# Patient Record
Sex: FEMALE | URBAN - METROPOLITAN AREA
[De-identification: names, ages, dates, MRNs, and addresses within clinical notes are randomized per-mention and may not be internally consistent; named-entity substitution may affect disease eponyms.]

---

## 2018-02-15 ENCOUNTER — OFFICE VISIT (OUTPATIENT)
Dept: DERMATOLOGY | Facility: CLINIC | Age: 38
End: 2018-02-15
Payer: COMMERCIAL

## 2018-02-15 DIAGNOSIS — L70.0 ACNE VULGARIS: Primary | ICD-10-CM

## 2018-02-15 DIAGNOSIS — L81.0 POST-INFLAMMATORY HYPERPIGMENTATION: ICD-10-CM

## 2018-02-15 RX ORDER — HYDROQUINONE 40 MG/G
CREAM TOPICAL
Qty: 30 G | Refills: 3 | Status: SHIPPED | OUTPATIENT
Start: 2018-02-15 | End: 2018-04-24

## 2018-02-15 RX ORDER — TRETINOIN 0.25 MG/G
CREAM TOPICAL
Qty: 45 G | Refills: 1 | Status: SHIPPED | OUTPATIENT
Start: 2018-02-15 | End: 2018-04-24

## 2018-02-15 ASSESSMENT — PAIN SCALES - GENERAL: PAINLEVEL: NO PAIN (0)

## 2018-02-15 NOTE — LETTER
2/15/2018     RE: Zeeshan Feng  [address]  [facility] [address]     Dear Colleague,    Thank you for referring your patient, Zeeshan Feng, to the [facility] DERMATOLOGY at [facility]. Please see a copy of my visit note below.    [facility] Dermatology Note    Dermatology Problem List:  1.Acne Vulgaris- tretinoin 0.025%  2.Hyperpigmetnation- tretinoin 0.025%, hydroquinone 4% BID      Encounter Date: Feb 15, 2018    CC:  Chief Complaint   Patient presents with     Skin Check     Zeeshan is here today for a skin check- some areas of concern on her face.        History of Present Illness:  Ms. Zeeshan Feng is a 37 year old female who presents with some areas of concern on the face. The patient explains she has tried numerous over the counter products (listed below) to try to correct dark spots on her face as well as lighten her facial skin in general. Patient states she also tried to lighten the general color of her skin on her body for a a while this regimen has been working for her body. She is discouraged because she hasn't had the same results on her face.     The patient states she also has acne, particularly on the bilateral cheeks, and would like to treat this.     Ida Lauder Advanced Night Repair   Vantex Skin Bleaching Cream/ Skin Brightening Night Serum/ True tone exfoliating cleanser   Lait 90 Eclaircissant- body -contains kojic acid  Clarins Toning Lotion   L'Oreal Triple Active Creme   Rhodesclair Vitamin E  White Essence Correcting Lightening serum -hydroquinone 2%  Clarins One-Stp Gentle Exfoliating Cleanser   Lacome Advanced Skin Corrector   Sue Hightowerden Ceramide capsules     Patient otherwise voices no further concerns at this time.     Past Medical History:   There is no problem list on file for this patient.    No past medical history on file.  No past surgical history on file.    Family History:  No family history of skin related diseases.      Medications:  No current outpatient prescriptions on file.       No Known Allergies    Review of Systems:  -Skin/Heme New Pt: The patient denies excessive scarring or problems healing except as per HPI. The patient denies excessive bleeding.  -Constitutional: The patient denies fatigue, fevers, chills, unintended weight loss, and night sweats.  -HEENT: Patient denies nonhealing oral sores.  -Skin: As above in HPI. No additional skin concerns.    Physical exam:  Vitals: There were no vitals taken for this visit.  GEN: This is a well developed, well-nourished female in no acute distress, in a pleasant mood.    SKIN: Focused examination of the face, abdomen, chest, bilateral upper extremities was performed.  -There is xerosis of the arms and abdomen  - Scattered hyperpigmentation macules on the cheeks bilaterally.   - Generalized mild hyperpigmentation of the face   -No other lesions of concern on areas examined.       Impression/Plan:  1. Acne vulgaris, face      Cleanse with gentle skin cleansers such as Cetaphil or CeraVe. May also continue using Clarins.     Moisturize after washing, ideally with a lotion that has at least SPF 30 in the morning.     Recommended beginning tretinoin 0.025% cream- apply a pea sized amount of the face before bed. Advised this can be drying/ irritating and therefore should increase to nightly as tolerated.      2. Generalized Mild Hyperpigmentation of the face - patient also has areas of PIH s/p acne papules    Begin hydroquinone 4% cream BID. Patient was advised to use this for no longer than 8 weeks and to discontinue use should hyperpigmentation increase or fail to lessen.      Start Tretinoin as described above    May continue with Vitamin C oil- to be applied once daily.    Suncreen SPF 30+ daily    Emphasized to the patient that there is no guarantee these products will provide her the results she is seeking and she should therefore maintain realistic expectations. Patient  expressed understanding.     We had a long discussion about variations in skin tone/color, and that some degree of variation is normal     3.    Xerosis of the arms and abdomen     Recommended moisturizers such as CeraVe or Cetaphil.     Follow-up in 2-3 months, sooner if new concerns or changes.       Staff Involved:  Scribe/Staff    Scribe Disclosure:   I, Lin Rodriguez, am serving as a scribe to document services personally performed by Lisbet Haq PA-C, based on data collection and the provider's statements to me.  Provider Disclosure:   The documentation recorded by the scribe accurately reflects the services I personally performed and the decisions made by me.    Lisbet Haq PA-C  [facility]: Phone: [phone], Fax: [phone]

## 2018-02-15 NOTE — MR AVS SNAPSHOT
After Visit Summary   2/15/2018    Zeeshan Feng    MRN: [id number]           Patient Information     Date Of Birth          1980        Visit Information        Provider Department      2/15/2018 6:00 PM Lisbet Haq PA-C [facility] Dermatology        Care Instructions    Sunscreen SPF30 daily  -Cetaphil - [employer]  -Cerave - [employer]    Cleansers  -keep using what you have  -Cetaphil basic cleanser - [employer]    Moisturizer at night  -cetaphil or cerave - target    Rx: tretinoin 0.025% cream  - helps acne, helps even skin tone, helps fine lines  Apply to clean dry skin at night only    Rx: hydroquinone 4% cream twice a day - bleaching cream  -use daily for no more than 8 weeks  Stop if darkness gets worse  Stop if no improvement after 8 weeks    Continue with vitamin C serum in the morning            Follow-ups after your visit        Your next 10 appointments already scheduled     Mar 07, 2018 12:30 PM CST   (Arrive by 12:15 PM)   New Patient Visit with SILVIA Truong [facility] Dermatology ([facility] [place])    [address] [place] [id number] [phone]              Who to contact     Please call your clinic at [phone] to:    Ask questions about your health    Make or cancel appointments    Discuss your medicines    Learn about your test results    Speak to your doctor            Additional Information About Your Visit        MyChart Information     [employer] is an electronic gateway that provides easy, online access to your medical records. With [employer], you can request a clinic appointment, read your test results, renew a prescription or communicate with your care team.     To sign up for [employer] visit the website at [email or web address]   You will be asked to enter the access code listed below, as well as some personal information. Please follow the directions to create your username and password.     Your access code is:  [email or web address]  Expires: 2018  6:30 AM     Your access code will  in 90 days. If you need help or a new code, please contact your [facility] or call [phone] for assistance.        Care EveryWhere ID     This is your Care EveryWhere ID. This could be used by other organizations to access your [place] medical records  [id number]         Blood Pressure from Last 3 Encounters:   No data found for BP    Weight from Last 3 Encounters:   No data found for Wt              Today, you had the following     No orders found for display       Primary Care Provider Fax #    Physician No Ref-Primary [phone]       No address on file        Equal Access to Services     NEENA [facility] : Hadii jose Avendano, waaxda ya, qasanju kaalmada yomi, julian valentino . So [facility] [phone].    ATENCIÓN: Si habla español, tiene a beltre disposición servicios gratuitos de asistencia lingüística. Llame al [phone].    We comply with applicable federal civil rights laws and [place] laws. We do not discriminate on the basis of race, color, national origin, age, disability, sex, sexual orientation, or gender identity.            Thank you!     Thank you for choosing [facility]  for your care. Our goal is always to provide you with excellent care. Hearing back from our patients is one way we can continue to improve our services. Please take a few minutes to complete the written survey that you may receive in the mail after your visit with us. Thank you!             Your Updated Medication List - Protect others around you: Learn how to safely use, store and throw away your medicines at www.disposemymeds.org.      Notice  As of 2/15/2018  6:33 PM    You have not been prescribed any medications.

## 2018-02-16 NOTE — PROGRESS NOTES
[facility] Dermatology Note    Dermatology Problem List:  1.Acne Vulgaris- tretinoin 0.025%  2.Hyperpigmetnation- tretinoin 0.025%, hydroquinone 4% BID      Encounter Date: Feb 15, 2018    CC:  Chief Complaint   Patient presents with     Skin Check     Zeeshan is here today for a skin check- some areas of concern on her face.        History of Present Illness:  Ms. Zeeshan Feng is a 37 year old female who presents with some areas of concern on the face. The patient explains she has tried numerous over the counter products (listed below) to try to correct dark spots on her face as well as lighten her facial skin in general. Patient states she also tried to lighten the general color of her skin on her body for a a while this regimen has been working for her body. She is discouraged because she hasn't had the same results on her face.     The patient states she also has acne, particularly on the bilateral cheeks, and would like to treat this.     Ida Lauder Advanced Night Repair   Vantex Skin Bleaching Cream/ Skin Brightening Night Serum/ True tone exfoliating cleanser   Lait 90 Eclaircissant- body -contains kojic acid  Clarins Toning Lotion   L'Oreal Triple Active Creme   Rhodesclair Vitamin E  White Essence Correcting Lightening serum -hydroquinone 2%  Clarins One-Stp Gentle Exfoliating Cleanser   Lacome Advanced Skin Corrector   Sue Hightowerden Ceramide capsules     Patient otherwise voices no further concerns at this time.     Past Medical History:   There is no problem list on file for this patient.    No past medical history on file.  No past surgical history on file.    Family History:  No family history of skin related diseases.     Medications:  No current outpatient prescriptions on file.       No Known Allergies    Review of Systems:  -Skin/Heme New Pt: The patient denies excessive scarring or problems healing except as per HPI. The patient denies excessive bleeding.  -Constitutional: The  patient denies fatigue, fevers, chills, unintended weight loss, and night sweats.  -HEENT: Patient denies nonhealing oral sores.  -Skin: As above in HPI. No additional skin concerns.    Physical exam:  Vitals: There were no vitals taken for this visit.  GEN: This is a well developed, well-nourished female in no acute distress, in a pleasant mood.    SKIN: Focused examination of the face, abdomen, chest, bilateral upper extremities was performed.  -There is xerosis of the arms and abdomen  - Scattered hyperpigmentation macules on the cheeks bilaterally.   - Generalized mild hyperpigmentation of the face   -No other lesions of concern on areas examined.       Impression/Plan:  1. Acne vulgaris, face      Cleanse with gentle skin cleansers such as Cetaphil or CeraVe. May also continue using Clarins.     Moisturize after washing, ideally with a lotion that has at least SPF 30 in the morning.     Recommended beginning tretinoin 0.025% cream- apply a pea sized amount of the face before bed. Advised this can be drying/ irritating and therefore should increase to nightly as tolerated.      2. Generalized Mild Hyperpigmentation of the face - patient also has areas of PIH s/p acne papules    Begin hydroquinone 4% cream BID. Patient was advised to use this for no longer than 8 weeks and to discontinue use should hyperpigmentation increase or fail to lessen.      Start Tretinoin as described above    May continue with Vitamin C oil- to be applied once daily.    Suncreen SPF 30+ daily    Emphasized to the patient that there is no guarantee these products will provide her the results she is seeking and she should therefore maintain realistic expectations. Patient expressed understanding.     We had a long discussion about variations in skin tone/color, and that some degree of variation is normal     3.    Xerosis of the arms and abdomen     Recommended moisturizers such as CeraVe or Cetaphil.     Follow-up in 2-3 months, sooner  if new concerns or changes.       Staff Involved:  Scribe/Staff    Scribe Disclosure:   I, Lin Rodriguez, am serving as a scribe to document services personally performed by Lisbet Haq PA-C, based on data collection and the provider's statements to me.  Provider Disclosure:   The documentation recorded by the scribe accurately reflects the services I personally performed and the decisions made by me.    Lisbet Haq PA-C  [facility]: Phone: [phone], Fax: [phone]

## 2018-02-16 NOTE — PATIENT INSTRUCTIONS
Sunscreen SPF30 daily  -Cetaphil - [employer]  -Cerave - [employer]    Cleansers  -keep using what you have  -Cetaphil basic cleanser - [employer]    Moisturizer at night  -cetaphil or cerave - target    Rx: tretinoin 0.025% cream  - helps acne, helps even skin tone, helps fine lines  Apply to clean dry skin at night only    Rx: hydroquinone 4% cream twice a day - bleaching cream  -use daily for no more than 8 weeks  Stop if darkness gets worse  Stop if no improvement after 8 weeks    Continue with vitamin C serum in the morning

## 2018-02-16 NOTE — NURSING NOTE
Dermatology Rooming Note    Zeeshan Feng's goals for this visit include:   Chief Complaint   Patient presents with     Skin Check     Malvinsue is here today for a skin check- some areas of concern on her face.      Sara Wood MA

## 2018-02-28 ENCOUNTER — TELEPHONE (OUTPATIENT)
Dept: DERMATOLOGY | Facility: CLINIC | Age: 38
End: 2018-02-28

## 2018-03-18 ENCOUNTER — HEALTH MAINTENANCE LETTER (OUTPATIENT)
Age: 38
End: 2018-03-18

## 2018-04-24 ENCOUNTER — OFFICE VISIT (OUTPATIENT)
Dept: DERMATOLOGY | Facility: CLINIC | Age: 38
End: 2018-04-24
Payer: COMMERCIAL

## 2018-04-24 DIAGNOSIS — L81.0 POST-INFLAMMATORY HYPERPIGMENTATION: ICD-10-CM

## 2018-04-24 DIAGNOSIS — L70.0 ACNE VULGARIS: Primary | ICD-10-CM

## 2018-04-24 RX ORDER — HYDROQUINONE 40 MG/G
CREAM TOPICAL
Qty: 30 G | Refills: 3 | Status: SHIPPED | OUTPATIENT
Start: 2018-04-24 | End: 2018-08-24

## 2018-04-24 RX ORDER — TRETINOIN 0.25 MG/G
CREAM TOPICAL
Qty: 45 G | Refills: 1 | Status: SHIPPED | OUTPATIENT
Start: 2018-04-24 | End: 2018-08-24

## 2018-04-24 RX ORDER — CLINDAMYCIN PHOSPHATE 10 UG/ML
LOTION TOPICAL 2 TIMES DAILY
Qty: 60 ML | Refills: 3 | Status: SHIPPED | OUTPATIENT
Start: 2018-04-24 | End: 2018-08-24

## 2018-04-24 ASSESSMENT — PAIN SCALES - GENERAL: PAINLEVEL: NO PAIN (0)

## 2018-04-24 NOTE — NURSING NOTE
Dermatology Rooming Note    Zeeshan Feng's goals for this visit include:   Chief Complaint   Patient presents with     Derm Problem     Malvinsue is here today for a skin follow up- Zeeshan is unsure if her skin has improved or not.      Sara Wood MA

## 2018-04-24 NOTE — PROGRESS NOTES
[facility] Dermatology Note      Dermatology Problem List:  1.Acne Vulgaris- tretinoin 0.025% QHS, clindamycin 1% lotion QD  2.Hyperpigmetnation- tretinoin 0.025%, hydroquinone 4% BID    CC:   Chief Complaint   Patient presents with     Derm Problem     Zeeshan is here today for a skin follow up- Zeeshan is unsure if her skin has improved or not.        Encounter Date: Apr 24, 2018    History of Present Illness:  Ms. Zeeshan Feng is a 37 year old female who presents for an acne and hyperpigmentation follow-up. She has been using the tretinoin and hydroquinone. She initially felt it was helping, but then last week she developed more pimples and the dark spots returned. She reports tolerable dryness with the tretinoin, but is managing it well with Cerave lotion. She has been using Cerave cleanser as well. She has no other concerns today and is feeling well.     Past Medical History:   There is no problem list on file for this patient.    History reviewed. No pertinent past medical history.  History reviewed. No pertinent surgical history.    Social History:  Not obtained today.    Family History:  No fhx of skin related diseases    Medications:  Current Outpatient Prescriptions   Medication Sig Dispense Refill     clindamycin (CLEOCIN T) 1 % lotion Apply topically 2 times daily 60 mL 3     hydroquinone 4 % CREA Apply topically QD or BID, for no more than 8 consecutive weeks 30 g 3     tretinoin (RETIN-A) 0.025 % cream Use every night 45 g 1     No Known Allergies      Review of Systems:  -Skin Establ Pt: The patient denies any new rash, pruritus, or lesions that are symptomatic, changing or bleeding, except as per HPI.  -Constitutional: The patient denies fatigue, fevers, chills, unintended weight loss, and night sweats.  -Skin: As above in HPI. No additional skin concerns.    Physical exam:  Vitals: There were no vitals taken for this visit.  GEN: This is a well developed, well-nourished female in no acute  distress, in a pleasant mood.    SKIN: Focused examination of the face, chest, and neck was performed.  - Scattered hyperpigmented macules on the cheeks bilaterally.   - Rare papules and comedones on the cheeks and forehead  - Generalized mild hyperpigmentation of the face   -No other lesions of concern on areas examined.     Impression/Plan:  1. Acne vulgaris, face      Cleanse with gentle skin cleansers such as Cetaphil or CeraVe.      Start clindamycin 1% lotion QAM before sunscreen application    Moisturize after washing, ideally with a lotion that has at least SPF 30 in the morning.     Continue tretinoin 0.025% cream- apply a pea sized amount of the face before bed mixed 1:1 with cerave lotion. Advised this can be drying/ irritating and therefore should increase to nightly as tolerated.      If still getting acne at 3mo follow-up will consider istoretinoin     2. Generalized Mild Hyperpigmentation of the face - patient also has areas of PIH s/p acne papules - improved    Continue hydroquinone 4% cream QD or BID    Continue Tretinoin as described above    May continue with Vitamin C oil- to be applied once daily.    Suncreen SPF 30+ daily    CC Dr. Gomez on close of this encounter.  Follow-up in 3 months, earlier for new or changing lesions.       Staff Involved:  Staff Only  All risks, benefits and alternatives were discussed with patient.  Patient is in agreement and understands the assessment and plan.  All questions were answered.    Lisbet Haq PA-C  [facility] [place]: Phone: [phone], Fax: [phone]

## 2018-04-24 NOTE — MR AVS SNAPSHOT
After Visit Summary   4/24/2018    Zeeshan Feng    MRN: [id number]           Patient Information     Date Of Birth          1980        Visit Information        Provider Department      4/24/2018 5:45 PM Lisbet Haq PA-C [facility] Dermatology        Today's Diagnoses     Acne vulgaris    -  1    Post-inflammatory hyperpigmentation          Care Instructions    Morning:  Clean your face - cerave cleanser  Clindamycin 1% lotion  Sunscreen    Evening:  Wash your face - cerave cleanser  Blend tretinoin with cerave moisturizer and apply to entire face          Follow-ups after your visit        Who to contact     Please call your clinic at [phone] to:    Ask questions about your health    Make or cancel appointments    Discuss your medicines    Learn about your test results    Speak to your doctor            Additional Information About Your Visit        MyChart Information     [email or web address] gives you secure access to your electronic health record. If you see a primary care provider, you can also send messages to your care team and make appointments. If you have questions, please call your primary care clinic.  If you do not have a primary care provider, please call [phone] and they will assist you.      [email or web address] is an electronic gateway that provides easy, online access to your medical records. With [email or web address], you can request a clinic appointment, read your test results, renew a prescription or communicate with your care team.     To access your existing account, please contact your [facility] or call [phone] for assistance.        Care EveryWhere ID     This is your Care EveryWhere ID. This could be used by other organizations to access your [place] medical records  [id number]         Blood Pressure from Last 3 Encounters:   No data found for BP    Weight from Last 3 Encounters:   No data found for Wt              Today, you had the following     No orders  found for display         Today's Medication Changes          These changes are accurate as of 4/24/18  6:07 PM.  If you have any questions, ask your nurse or doctor.               Start taking these medicines.        Dose/Directions    clindamycin 1 % lotion   Commonly known as:  CLEOCIN T   Used for:  Acne vulgaris   Started by:  Lisbet Haq PA-C        Apply topically 2 times daily   Quantity:  60 mL   Refills:  3            Where to get your medicines      These medications were sent to [place] [address] [facility] - [place], [place] - [address]  [address] [address]     Phone:  [phone]     clindamycin 1 % lotion    hydroquinone 4 % Crea    tretinoin 0.025 % cream                Primary Care Provider Fax #    Physician No Ref-Primary [phone]       No address on file        Equal Access to Services     ADELITA SZYMANSKI : Valerie johnson Somikal, waaxda luqadaha, qaybta kaalmada adeegyakay, julian valentino . So [facility] [phone].    ATENCIÓN: Si habla español, tiene a beltre disposición servicios gratuitos de asistencia lingüística. Llame al [phone].    We comply with applicable federal civil rights laws and [place] laws. We do not discriminate on the basis of race, color, national origin, age, disability, sex, sexual orientation, or gender identity.            Thank you!     Thank you for choosing [facility] DERMATOLOGY  for your care. Our goal is always to provide you with excellent care. Hearing back from our patients is one way we can continue to improve our services. Please take a few minutes to complete the written survey that you may receive in the mail after your visit with us. Thank you!             Your Updated Medication List - Protect others around you: Learn how to safely use, store and throw away your medicines at www.disposemymeds.org.          This list is accurate as of 4/24/18  6:07 PM.  Always use your most recent med list.                    Brand Name Dispense Instructions for use Diagnosis    clindamycin 1 % lotion    CLEOCIN T    60 mL    Apply topically 2 times daily    Acne vulgaris       hydroquinone 4 % Crea     30 g    Apply topically QD or BID, for no more than 8 consecutive weeks    Post-inflammatory hyperpigmentation       tretinoin 0.025 % cream    RETIN-A    45 g    Use every night    Acne vulgaris

## 2018-04-24 NOTE — PATIENT INSTRUCTIONS
Morning:  Clean your face - cerave cleanser  Clindamycin 1% lotion  Sunscreen    Evening:  Wash your face - cerave cleanser  Blend tretinoin with cerave moisturizer and apply to entire face

## 2018-04-24 NOTE — LETTER
4/24/2018       RE: Zeeshan Feng  [address]  [facility] [address]     Dear Colleague,    Thank you for referring your patient, Zeeshan Feng, to the [facility] DERMATOLOGY at [facility]. Please see a copy of my visit note below.    [facility] Dermatology Note      Dermatology Problem List:  1.Acne Vulgaris- tretinoin 0.025% QHS, clindamycin 1% lotion QD  2.Hyperpigmetnation- tretinoin 0.025%, hydroquinone 4% BID    CC:   Chief Complaint   Patient presents with     Derm Problem     Zeeshan is here today for a skin follow up- Zeeshan is unsure if her skin has improved or not.        Encounter Date: Apr 24, 2018    History of Present Illness:  Ms. Zeeshan Feng is a 37 year old female who presents for an acne and hyperpigmentation follow-up. She has been using the tretinoin and hydroquinone. She initially felt it was helping, but then last week she developed more pimples and the dark spots returned. She reports tolerable dryness with the tretinoin, but is managing it well with Cerave lotion. She has been using Cerave cleanser as well. She has no other concerns today and is feeling well.     Past Medical History:   There is no problem list on file for this patient.    History reviewed. No pertinent past medical history.  History reviewed. No pertinent surgical history.    Social History:  Not obtained today.    Family History:  No fhx of skin related diseases    Medications:  Current Outpatient Prescriptions   Medication Sig Dispense Refill     clindamycin (CLEOCIN T) 1 % lotion Apply topically 2 times daily 60 mL 3     hydroquinone 4 % CREA Apply topically QD or BID, for no more than 8 consecutive weeks 30 g 3     tretinoin (RETIN-A) 0.025 % cream Use every night 45 g 1     No Known Allergies      Review of Systems:  -Skin Establ Pt: The patient denies any new rash, pruritus, or lesions that are symptomatic, changing or bleeding, except as per HPI.  -Constitutional: The  patient denies fatigue, fevers, chills, unintended weight loss, and night sweats.  -Skin: As above in HPI. No additional skin concerns.    Physical exam:  Vitals: There were no vitals taken for this visit.  GEN: This is a well developed, well-nourished female in no acute distress, in a pleasant mood.    SKIN: Focused examination of the face, chest, and neck was performed.  - Scattered hyperpigmented macules on the cheeks bilaterally.   - Rare papules and comedones on the cheeks and forehead  - Generalized mild hyperpigmentation of the face   -No other lesions of concern on areas examined.     Impression/Plan:  1. Acne vulgaris, face      Cleanse with gentle skin cleansers such as Cetaphil or CeraVe.      Start clindamycin 1% lotion QAM before sunscreen application    Moisturize after washing, ideally with a lotion that has at least SPF 30 in the morning.     Continue tretinoin 0.025% cream- apply a pea sized amount of the face before bed mixed 1:1 with cerave lotion. Advised this can be drying/ irritating and therefore should increase to nightly as tolerated.      If still getting acne at 3mo follow-up will consider istoretinoin     2. Generalized Mild Hyperpigmentation of the face - patient also has areas of PIH s/p acne papules - improved    Continue hydroquinone 4% cream QD or BID    Continue Tretinoin as described above    May continue with Vitamin C oil- to be applied once daily.    Suncreen SPF 30+ daily    CC Dr. Gomez on close of this encounter.  Follow-up in 3 months, earlier for new or changing lesions.       Staff Involved:  Staff Only  All risks, benefits and alternatives were discussed with patient.  Patient is in agreement and understands the assessment and plan.  All questions were answered.    Lisbet Haq PA-C  [facility] [place]: Phone: [phone], Fax: [phone]

## 2018-07-24 ENCOUNTER — OFFICE VISIT (OUTPATIENT)
Dept: DERMATOLOGY | Facility: CLINIC | Age: 38
End: 2018-07-24
Payer: COMMERCIAL

## 2018-07-24 ENCOUNTER — APPOINTMENT (OUTPATIENT)
Dept: LAB | Facility: CLINIC | Age: 38
End: 2018-07-24
Payer: COMMERCIAL

## 2018-07-24 DIAGNOSIS — L81.0 POST-INFLAMMATORY HYPERPIGMENTATION: ICD-10-CM

## 2018-07-24 DIAGNOSIS — Z79.899 ON ISOTRETINOIN THERAPY: Primary | ICD-10-CM

## 2018-07-24 DIAGNOSIS — L70.0 ACNE VULGARIS: ICD-10-CM

## 2018-07-24 LAB — HCG UR QL: NEGATIVE

## 2018-07-24 ASSESSMENT — PAIN SCALES - GENERAL: PAINLEVEL: NO PAIN (0)

## 2018-07-24 NOTE — PROGRESS NOTES
[facility] Dermatology Note      Dermatology Problem List:  1.Acne Vulgaris- tretinoin 0.025% QHS, clindamycin 1% lotion every day  -plans to start isotretinoin next month  2.Hyperpigmetnation- tretinoin 0.025%, hydroquinone 4% BID    CC:   Chief Complaint   Patient presents with     Derm Problem     Zeeshan is heren today for an acne and hyperpigmentation follow up- notes improvement.          Encounter Date: Jul 24, 2018    History of Present Illness:  Ms. Zeeshan Feng is a 38 year old female who returns regarding acne. She is currently on tretinoin, clindamycin and hydroquinone for her acne as well as her PIH. She states her acne has improved, although she is still getting new ones, and that her hyperpigmentation has improved greatly. Other people have commented to her that things are looking better. She is happy with this, but would like to stop getting acne. She is not currently sexually active and has no sexual partners. She has no hx of depression/anxiety. She has no hx of IBD. Aside from acne medications she takes no other medications at this time. She is otherwise healthy and feels well today.    Past Medical History:   There is no problem list on file for this patient.    History reviewed. No pertinent past medical history.  History reviewed. No pertinent surgical history.    Social History:  The patient works. Not currently sexually active at all.     Family History:  Not obtained today.    Medications:  Current Outpatient Prescriptions   Medication Sig Dispense Refill     clindamycin (CLEOCIN T) 1 % lotion Apply topically 2 times daily 60 mL 3     hydroquinone 4 % CREA Apply topically QD or BID, for no more than 8 consecutive weeks 30 g 3     tretinoin (RETIN-A) 0.025 % cream Use every night 45 g 1     No Known Allergies      Review of Systems:  -Constitutional: The patient denies fatigue, fevers, chills, unintended weight loss, and night sweats.  -Skin: As above in HPI. No additional skin  concerns.  -Neuro: no HA or vision changes  -GI: No nausea, blood in stool, diarrhea, hx of IBD  -Psych: no depression/anxiety, mood changes, or sleep problems   -Musculoskeletal: no joint or muscle pain or swelling   -Heme/Lymph: no concerning bumps, no bleeding problems    Physical exam:  Vitals: Not obtained this visit  GEN: This is a well developed, well-nourished female in no acute distress, in a pleasant mood.    SKIN: Acne exam, which includes the face, neck, upper central chest, and upper central back was performed.  -There are superifical acneiform papules with intermixed open and closed comedones on the cheeks and forehead, improved  -hyperpigmented papules and macules on the bilateral cheeks - improved since last visit  -No other lesions of concern on areas examined.     Impression/Plan:  1. Acne vulgaris  Will start isotretinoin 40mg next month. Goal dose is 150-220mg/kg for this patient.   Method of contraception includes: Abstinence  Discussion of the risks and side effects of isotretinoin including but not limited to mucocutaneous dryness, arthralgias, myalgias, depression, suicidal ideation, headache, blurred vision, increase in liver function test and increase in lipids. The iPledge program brochure was provided and the contents discussed with the patient. The patient was counseled that they cannot give blood while on isotretinoin. Advised against tattoos and waxing. No personal or family history of inflammatory bowel disease or hypertriglyceridemia known to patient. Reviewed need to avoid alcohol on medication. The iPledge program consent was obtained. Patient counseled that if they wear contacts, the eyes may become too dry to tolerate. Recommend follow up with eye doctor if this occurs.    Discussed need for sun protection, at least SPF 30+.  Urine pregnancy test obtained today.  Next month: baseline labs including qualitative hCG, CBC, GGT, BUN/Cr, fasting lipids and AST/ALT will be obtained.    Patient's iPledge # is [id number].   The patient will stop all other acne medications next month including tretinoin and clindamycin.  Total dose: 0mg/kg    2. Post-inflammatory Inflammation - improved    May continue with topical hydroquinone for now, will plan to discontinue next month when starts isotretinoin    Continue with photoprotection    CC Dr. Gomez on close of this encounter.  Follow-up in 1 month, earlier for new or changing lesions.       Staff Involved:  Staff Only  All risks, benefits and alternatives were discussed with patient.  Patient is in agreement and understands the assessment and plan.  All questions were answered.    Lisbet Haq PA-C  [facility]: Phone: [phone], Fax: [phone]

## 2018-07-24 NOTE — LETTER
7/24/2018       RE: Zeeshan Feng  [address]  [facility] [address]     Dear Colleague,    Thank you for referring your patient, Zeeshan Feng, to the [facility] DERMATOLOGY at [facility]. Please see a copy of my visit note below.    [facility] Dermatology Note      Dermatology Problem List:  1.Acne Vulgaris- tretinoin 0.025% QHS, clindamycin 1% lotion every day  -plans to start isotretinoin next month  2.Hyperpigmetnation- tretinoin 0.025%, hydroquinone 4% BID    CC:   Chief Complaint   Patient presents with     Derm Problem     Zeeshan is heren today for an acne and hyperpigmentation follow up- notes improvement.          Encounter Date: Jul 24, 2018    History of Present Illness:  Ms. Zeeshan Feng is a 38 year old female who returns regarding acne. She is currently on tretinoin, clindamycin and hydroquinone for her acne as well as her PIH. She states her acne has improved, although she is still getting new ones, and that her hyperpigmentation has improved greatly. Other people have commented to her that things are looking better. She is happy with this, but would like to stop getting acne. She is not currently sexually active and has no sexual partners. She has no hx of depression/anxiety. She has no hx of IBD. Aside from acne medications she takes no other medications at this time. She is otherwise healthy and feels well today.    Past Medical History:   There is no problem list on file for this patient.    History reviewed. No pertinent past medical history.  History reviewed. No pertinent surgical history.    Social History:  The patient works. Not currently sexually active at all.     Family History:  Not obtained today.    Medications:  Current Outpatient Prescriptions   Medication Sig Dispense Refill     clindamycin (CLEOCIN T) 1 % lotion Apply topically 2 times daily 60 mL 3     hydroquinone 4 % CREA Apply topically QD or BID, for no more than 8 consecutive  weeks 30 g 3     tretinoin (RETIN-A) 0.025 % cream Use every night 45 g 1     No Known Allergies      Review of Systems:  -Constitutional: The patient denies fatigue, fevers, chills, unintended weight loss, and night sweats.  -Skin: As above in HPI. No additional skin concerns.  -Neuro: no HA or vision changes  -GI: No nausea, blood in stool, diarrhea, hx of IBD  -Psych: no depression/anxiety, mood changes, or sleep problems   -Musculoskeletal: no joint or muscle pain or swelling   -Heme/Lymph: no concerning bumps, no bleeding problems    Physical exam:  Vitals: Not obtained this visit  GEN: This is a well developed, well-nourished female in no acute distress, in a pleasant mood.    SKIN: Acne exam, which includes the face, neck, upper central chest, and upper central back was performed.  -There are superifical acneiform papules with intermixed open and closed comedones on the cheeks and forehead, improved  -hyperpigmented papules and macules on the bilateral cheeks - improved since last visit  -No other lesions of concern on areas examined.     Impression/Plan:  1. Acne vulgaris  Will start isotretinoin 40mg next month. Goal dose is 150-220mg/kg for this patient.   Method of contraception includes: Abstinence  Discussion of the risks and side effects of isotretinoin including but not limited to mucocutaneous dryness, arthralgias, myalgias, depression, suicidal ideation, headache, blurred vision, increase in liver function test and increase in lipids. The iPledge program brochure was provided and the contents discussed with the patient. The patient was counseled that they cannot give blood while on isotretinoin. Advised against tattoos and waxing. No personal or family history of inflammatory bowel disease or hypertriglyceridemia known to patient. Reviewed need to avoid alcohol on medication. The iPledge program consent was obtained. Patient counseled that if they wear contacts, the eyes may become too dry to  tolerate. Recommend follow up with eye doctor if this occurs.    Discussed need for sun protection, at least SPF 30+.  Urine pregnancy test obtained today.  Next month: baseline labs including qualitative hCG, CBC, GGT, BUN/Cr, fasting lipids and AST/ALT will be obtained.   Patient's iPledge # is [id number].   The patient will stop all other acne medications next month including tretinoin and clindamycin.  Total dose: 0mg/kg    2. Post-inflammatory Inflammation - improved    May continue with topical hydroquinone for now, will plan to discontinue next month when starts isotretinoin    Continue with photoprotection    CC Dr. Gomez on close of this encounter.  Follow-up in 1 month, earlier for new or changing lesions.       Staff Involved:  Staff Only  All risks, benefits and alternatives were discussed with patient.  Patient is in agreement and understands the assessment and plan.  All questions were answered.    Lisbet Haq PA-C  [facility]: Phone: [phone], Fax: [phone]

## 2018-07-24 NOTE — NURSING NOTE
Dermatology Rooming Note    Zeeshan Feng's goals for this visit include:   Chief Complaint   Patient presents with     Derm Problem     Zeeshan is heren today for an acne and hyperpigmentation follow up- notes improvement.      Sara Wood MA

## 2018-08-24 ENCOUNTER — OFFICE VISIT (OUTPATIENT)
Dept: DERMATOLOGY | Facility: CLINIC | Age: 38
End: 2018-08-24
Payer: COMMERCIAL

## 2018-08-24 DIAGNOSIS — Z79.899 ON ISOTRETINOIN THERAPY: ICD-10-CM

## 2018-08-24 DIAGNOSIS — Z79.899 ON ISOTRETINOIN THERAPY: Primary | ICD-10-CM

## 2018-08-24 DIAGNOSIS — L70.0 ACNE VULGARIS: ICD-10-CM

## 2018-08-24 LAB
ALBUMIN SERPL-MCNC: 3.9 G/DL (ref 3.4–5)
ALP SERPL-CCNC: 83 U/L (ref 40–150)
ALT SERPL W P-5'-P-CCNC: 21 U/L (ref 0–50)
ANION GAP SERPL CALCULATED.3IONS-SCNC: 9 MMOL/L (ref 3–14)
AST SERPL W P-5'-P-CCNC: 17 U/L (ref 0–45)
BASOPHILS # BLD AUTO: 0 10E9/L (ref 0–0.2)
BASOPHILS NFR BLD AUTO: 0.4 %
BILIRUB SERPL-MCNC: 0.3 MG/DL (ref 0.2–1.3)
BUN SERPL-MCNC: 6 MG/DL (ref 7–30)
CALCIUM SERPL-MCNC: 8.7 MG/DL (ref 8.5–10.1)
CHLORIDE SERPL-SCNC: 106 MMOL/L (ref 94–109)
CO2 SERPL-SCNC: 24 MMOL/L (ref 20–32)
CREAT SERPL-MCNC: 0.75 MG/DL (ref 0.52–1.04)
DIFFERENTIAL METHOD BLD: NORMAL
EOSINOPHIL # BLD AUTO: 0 10E9/L (ref 0–0.7)
EOSINOPHIL NFR BLD AUTO: 0.3 %
ERYTHROCYTE [DISTWIDTH] IN BLOOD BY AUTOMATED COUNT: 13.7 % (ref 10–15)
GFR SERPL CREATININE-BSD FRML MDRD: 87 ML/MIN/1.7M2
GGT SERPL-CCNC: 12 U/L (ref 0–40)
GLUCOSE SERPL-MCNC: 84 MG/DL (ref 70–99)
HCG UR QL: NEGATIVE
HCT VFR BLD AUTO: 38.8 % (ref 35–47)
HGB BLD-MCNC: 13 G/DL (ref 11.7–15.7)
IMM GRANULOCYTES # BLD: 0 10E9/L (ref 0–0.4)
IMM GRANULOCYTES NFR BLD: 0.3 %
LYMPHOCYTES # BLD AUTO: 2.8 10E9/L (ref 0.8–5.3)
LYMPHOCYTES NFR BLD AUTO: 37.6 %
MCH RBC QN AUTO: 31.5 PG (ref 26.5–33)
MCHC RBC AUTO-ENTMCNC: 33.5 G/DL (ref 31.5–36.5)
MCV RBC AUTO: 94 FL (ref 78–100)
MONOCYTES # BLD AUTO: 0.5 10E9/L (ref 0–1.3)
MONOCYTES NFR BLD AUTO: 7.2 %
NEUTROPHILS # BLD AUTO: 4.1 10E9/L (ref 1.6–8.3)
NEUTROPHILS NFR BLD AUTO: 54.2 %
NRBC # BLD AUTO: 0 10*3/UL
NRBC BLD AUTO-RTO: 0 /100
PLATELET # BLD AUTO: 200 10E9/L (ref 150–450)
POTASSIUM SERPL-SCNC: 3.5 MMOL/L (ref 3.4–5.3)
PROT SERPL-MCNC: 7.4 G/DL (ref 6.8–8.8)
RBC # BLD AUTO: 4.13 10E12/L (ref 3.8–5.2)
SODIUM SERPL-SCNC: 139 MMOL/L (ref 133–144)
TRIGL SERPL-MCNC: 72 MG/DL
WBC # BLD AUTO: 7.6 10E9/L (ref 4–11)

## 2018-08-24 RX ORDER — ISOTRETINOIN 40 MG/1
40 CAPSULE ORAL DAILY
Qty: 30 CAPSULE | Refills: 0 | Status: SHIPPED | OUTPATIENT
Start: 2018-08-24 | End: 2018-09-26

## 2018-08-24 ASSESSMENT — PAIN SCALES - GENERAL: PAINLEVEL: NO PAIN (0)

## 2018-08-24 NOTE — MR AVS SNAPSHOT
After Visit Summary   8/24/2018    Zeeshan Feng    MRN: [id number]           Patient Information     Date Of Birth          1980        Visit Information        Provider Department      8/24/2018 2:45 PM Lisbet Haq PA-C [facility] Dermatology        Today's Diagnoses     On isotretinoin therapy    -  1    Acne vulgaris          Care Instructions    cetaphil or cerave products - [facility]          Follow-ups after your visit        Follow-up notes from your care team     Return in about 1 month (around 9/24/2018).      Your next 10 appointments already scheduled     Aug 24, 2018  3:00 PM CDT   LAB with  LAB   [facility] ([facility])    [address] [place] [id number] [phone]           Please do not eat 10-12 hours before your appointment if you are coming in fasting for labs on lipids, cholesterol, or glucose (sugar). This does not apply to pregnant women. Water, hot tea and black coffee (with nothing added) are okay. Do not drink other fluids, diet soda or chew gum.            Sep 26, 2018  5:30 PM CDT   (Arrive by 5:15 PM)   Return Visit with SILVIA Diaz [facility] Dermatology ([facility])    [address] [place] [id number] [phone]              Future tests that were ordered for you today     Open Future Orders        Priority Expected Expires Ordered    CBC with platelets differential Routine  8/24/2019 8/24/2018    Comprehensive metabolic panel Routine  8/24/2019 8/24/2018    GGT Routine  8/25/2019 8/24/2018    Triglycerides Routine  8/24/2019 8/24/2018            Who to contact     Please call your clinic at [phone] to:    Ask questions about your health    Make or cancel appointments    Discuss your medicines    Learn about your test results    Speak to your doctor            Additional Information About Your Visit        MyChart Information      [employer] gives you secure access to your electronic health record. If you see a primary care provider, you can also send messages to your care team and make appointments. If you have questions, please call your primary care clinic.  If you do not have a primary care provider, please call [phone] and they will assist you.      [employer] is an electronic gateway that provides easy, online access to your medical records. With [employer], you can request a clinic appointment, read your test results, renew a prescription or communicate with your care team.     To access your existing account, please contact your [facility] or call [phone] for assistance.        Care EveryWhere ID     This is your Care EveryWhere ID. This could be used by other organizations to access your [facility] medical records  [id number]         Blood Pressure from Last 3 Encounters:   No data found for BP    Weight from Last 3 Encounters:   No data found for Wt              We Performed the Following     HCG Qual, Urine - [facility],  Bebe Paige  ([id number])          Today's Medication Changes          These changes are accurate as of 8/24/18  2:58 PM.  If you have any questions, ask your nurse or doctor.               Start taking these medicines.        Dose/Directions    ISOtretinoin 40 MG capsule   Commonly known as:  ACCUTANE   Used for:  Acne vulgaris   Started by:  Lisbet Haq PA-C        Dose:  40 mg   Take 1 capsule (40 mg) by mouth daily   Quantity:  30 capsule   Refills:  0            Where to get your medicines      These medications were sent to [place] [address] [place] [facility] [address] [facility]  [address] [facility] [address]     Phone:  [phone]     ISOtretinoin 40 MG capsule                Primary Care Provider Fax #    Physician No Ref-Primary [phone]       No address on file        Equal Access to Services     ADELITA SZYMANSKI AH: cholo Ahuja,  marily barbozaalzahida phillipmichael soilain hayaan adeeg kharash la'aan ah. So [facility] [phone].    ATENCIÓN: Si mariella mounika, tiene a beltre disposición servicios gratuitos de asistencia lingüística. Lauraame al [phone].    We comply with applicable federal civil rights laws and [place] laws. We do not discriminate on the basis of race, color, national origin, age, disability, sex, sexual orientation, or gender identity.            Thank you!     Thank you for choosing [facility] DERMATOLOGY  for your care. Our goal is always to provide you with excellent care. Hearing back from our patients is one way we can continue to improve our services. Please take a few minutes to complete the written survey that you may receive in the mail after your visit with us. Thank you!             Your Updated Medication List - Protect others around you: Learn how to safely use, store and throw away your medicines at www.disposemymeds.org.          This list is accurate as of 8/24/18  2:58 PM.  Always use your most recent med list.                   Brand Name Dispense Instructions for use Diagnosis    ISOtretinoin 40 MG capsule    ACCUTANE    30 capsule    Take 1 capsule (40 mg) by mouth daily    Acne vulgaris

## 2018-08-24 NOTE — PROGRESS NOTES
[facility] Dermatology Note      Dermatology Problem List:  1.Acne Vulgaris- started isotretinoin 40mg po QD 8/24/18  -previous txs: tretinoin 0.025% QHS, clindamycin 1% lotion every day  2.Hyperpigmetnation- tretinoin 0.025%, hydroquinone 4% BID    CC:   Chief Complaint   Patient presents with     Accutane     Accutane recheck         Encounter Date: Aug 24, 2018    History of Present Illness:  Ms. Zeeshan Feng is a 38 year old female who returns regarding acne. She is here to start isotretinoin today. She received the ipledge booklet and read over it and has no further questions. She plans to discontinue her tretinoin, clindamycin and hydroquinone today. No hx of depression, anxiety, UC or Chrons. She is not currently sexually active. She is otherwise healthy and feels well today.     Past Medical History:   There is no problem list on file for this patient.    No past medical history on file.  No past surgical history on file.    Social History:  The patient works. Not currently sexually active at all.     Family History:  Not obtained today.    Medications:  Current Outpatient Prescriptions   Medication Sig Dispense Refill     clindamycin (CLEOCIN T) 1 % lotion Apply topically 2 times daily 60 mL 3     hydroquinone 4 % CREA Apply topically QD or BID, for no more than 8 consecutive weeks 30 g 3     tretinoin (RETIN-A) 0.025 % cream Use every night 45 g 1     No Known Allergies      Review of Systems:  -Constitutional: The patient denies fatigue, fevers, chills, unintended weight loss, and night sweats.  -Skin: As above in HPI. No additional skin concerns.  -Neuro: no HA or vision changes  -GI: No nausea, blood in stool, diarrhea, hx of IBD  -Psych: no depression/anxiety, mood changes, or sleep problems   -Musculoskeletal: no joint or muscle pain or swelling   -Heme/Lymph: no concerning bumps, no bleeding problems    Physical exam:  Vitals: 130lbs  GEN: This is a well developed, well-nourished  female in no acute distress, in a pleasant mood.    SKIN: Acne exam, which includes the face, neck, upper central chest, and upper central back was performed.  -There are superifical acneiform papules with intermixed open and closed comedones on the cheeks and forehead, improved  -hyperpigmented papules and macules on the bilateral cheeks - improved since last visit  -No other lesions of concern on areas examined.     Impression/Plan:  1. Acne vulgaris  Will start isotretinoin 40mg this month. Goal dose is 150-220mg/kg for this patient.   Method of contraception includes: Abstinence  Discussion of the risks and side effects of isotretinoin including but not limited to mucocutaneous dryness, arthralgias, myalgias, depression, suicidal ideation, headache, blurred vision, increase in liver function test and increase in lipids. The iPledge program brochure was provided and the contents discussed with the patient. The patient was counseled that they cannot give blood while on isotretinoin. Advised against tattoos and waxing. No personal or family history of inflammatory bowel disease or hypertriglyceridemia known to patient. Reviewed need to avoid alcohol on medication. The iPledge program consent was obtained. Patient counseled that if they wear contacts, the eyes may become too dry to tolerate. Recommend follow up with eye doctor if this occurs.    Discussed need for sun protection, at least SPF 30+.  Urine pregnancy test obtained today.  Labs including qualitative hCG, CBC, GGT, BUN/Cr, fasting lipids and AST/ALT will be obtained.   Patient's iPledge # is [id number].   Total dose: 0mg/kg    CC Dr. Gomez on close of this encounter.  Follow-up in 1 month, earlier for new or changing lesions.       Staff Involved:  Staff Only  All risks, benefits and alternatives were discussed with patient.  Patient is in agreement and understands the assessment and plan.  All questions were answered.    Lisbet Haq  SILVIA  [facility]: Phone: [phone], Fax: [phone]

## 2018-08-24 NOTE — LETTER
8/24/2018       RE: Zeeshan Feng  [address]  [facility] [address]     Dear Colleague,    Thank you for referring your patient, Zeeshan Feng, to the [facility] DERMATOLOGY at [facility]. Please see a copy of my visit note below.    [facility] Dermatology Note      Dermatology Problem List:  1.Acne Vulgaris- started isotretinoin 40mg po QD 8/24/18  -previous txs: tretinoin 0.025% QHS, clindamycin 1% lotion every day  2.Hyperpigmetnation- tretinoin 0.025%, hydroquinone 4% BID    CC:   Chief Complaint   Patient presents with     Accutane     Accutane recheck         Encounter Date: Aug 24, 2018    History of Present Illness:  Ms. Zeeshan Feng is a 38 year old female who returns regarding acne. She is here to start isotretinoin today. She received the ipledge booklet and read over it and has no further questions. She plans to discontinue her tretinoin, clindamycin and hydroquinone today. No hx of depression, anxiety, UC or Chrons. She is not currently sexually active. She is otherwise healthy and feels well today.     Past Medical History:   There is no problem list on file for this patient.    No past medical history on file.  No past surgical history on file.    Social History:  The patient works. Not currently sexually active at all.     Family History:  Not obtained today.    Medications:  Current Outpatient Prescriptions   Medication Sig Dispense Refill     clindamycin (CLEOCIN T) 1 % lotion Apply topically 2 times daily 60 mL 3     hydroquinone 4 % CREA Apply topically QD or BID, for no more than 8 consecutive weeks 30 g 3     tretinoin (RETIN-A) 0.025 % cream Use every night 45 g 1     No Known Allergies      Review of Systems:  -Constitutional: The patient denies fatigue, fevers, chills, unintended weight loss, and night sweats.  -Skin: As above in HPI. No additional skin concerns.  -Neuro: no HA or vision changes  -GI: No nausea, blood in stool, diarrhea, hx  of IBD  -Psych: no depression/anxiety, mood changes, or sleep problems   -Musculoskeletal: no joint or muscle pain or swelling   -Heme/Lymph: no concerning bumps, no bleeding problems    Physical exam:  Vitals: 130lbs  GEN: This is a well developed, well-nourished female in no acute distress, in a pleasant mood.    SKIN: Acne exam, which includes the face, neck, upper central chest, and upper central back was performed.  -There are superifical acneiform papules with intermixed open and closed comedones on the cheeks and forehead, improved  -hyperpigmented papules and macules on the bilateral cheeks - improved since last visit  -No other lesions of concern on areas examined.     Impression/Plan:  1. Acne vulgaris  Will start isotretinoin 40mg this month. Goal dose is 150-220mg/kg for this patient.   Method of contraception includes: Abstinence  Discussion of the risks and side effects of isotretinoin including but not limited to mucocutaneous dryness, arthralgias, myalgias, depression, suicidal ideation, headache, blurred vision, increase in liver function test and increase in lipids. The iPledge program brochure was provided and the contents discussed with the patient. The patient was counseled that they cannot give blood while on isotretinoin. Advised against tattoos and waxing. No personal or family history of inflammatory bowel disease or hypertriglyceridemia known to patient. Reviewed need to avoid alcohol on medication. The iPledge program consent was obtained. Patient counseled that if they wear contacts, the eyes may become too dry to tolerate. Recommend follow up with eye doctor if this occurs.    Discussed need for sun protection, at least SPF 30+.  Urine pregnancy test obtained today.  Labs including qualitative hCG, CBC, GGT, BUN/Cr, fasting lipids and AST/ALT will be obtained.   Patient's iPledge # is [id number].   Total dose: 0mg/kg    CC Dr. Gomez on close of this encounter.  Follow-up in 1 month,  earlier for new or changing lesions.       Staff Involved:  Staff Only  All risks, benefits and alternatives were discussed with patient.  Patient is in agreement and understands the assessment and plan.  All questions were answered.    Lisbet Haq PA-C  [facility]: Phone: [phone], Fax: [phone]

## 2018-08-24 NOTE — NURSING NOTE
Chief Complaint   Patient presents with     Accutane     Accutane recheck     Rachelle Chang CMA

## 2018-09-26 ENCOUNTER — OFFICE VISIT (OUTPATIENT)
Dept: DERMATOLOGY | Facility: CLINIC | Age: 38
End: 2018-09-26
Payer: COMMERCIAL

## 2018-09-26 DIAGNOSIS — L70.0 ACNE VULGARIS: ICD-10-CM

## 2018-09-26 DIAGNOSIS — Z79.899 ON ISOTRETINOIN THERAPY: Primary | ICD-10-CM

## 2018-09-26 DIAGNOSIS — Z79.899 ON ISOTRETINOIN THERAPY: ICD-10-CM

## 2018-09-26 LAB
ALBUMIN SERPL-MCNC: 3.5 G/DL (ref 3.4–5)
ALP SERPL-CCNC: 80 U/L (ref 40–150)
ALT SERPL W P-5'-P-CCNC: 25 U/L (ref 0–50)
ANION GAP SERPL CALCULATED.3IONS-SCNC: 5 MMOL/L (ref 3–14)
AST SERPL W P-5'-P-CCNC: 18 U/L (ref 0–45)
BASOPHILS # BLD AUTO: 0 10E9/L (ref 0–0.2)
BASOPHILS NFR BLD AUTO: 0.7 %
BILIRUB SERPL-MCNC: 0.2 MG/DL (ref 0.2–1.3)
BUN SERPL-MCNC: 3 MG/DL (ref 7–30)
CALCIUM SERPL-MCNC: 9 MG/DL (ref 8.5–10.1)
CHLORIDE SERPL-SCNC: 107 MMOL/L (ref 94–109)
CO2 SERPL-SCNC: 27 MMOL/L (ref 20–32)
CREAT SERPL-MCNC: 0.79 MG/DL (ref 0.52–1.04)
DIFFERENTIAL METHOD BLD: NORMAL
EOSINOPHIL # BLD AUTO: 0 10E9/L (ref 0–0.7)
EOSINOPHIL NFR BLD AUTO: 0.6 %
ERYTHROCYTE [DISTWIDTH] IN BLOOD BY AUTOMATED COUNT: 14 % (ref 10–15)
GFR SERPL CREATININE-BSD FRML MDRD: 82 ML/MIN/1.7M2
GGT SERPL-CCNC: 18 U/L (ref 0–40)
GLUCOSE SERPL-MCNC: 82 MG/DL (ref 70–99)
HCG UR QL: NEGATIVE
HCT VFR BLD AUTO: 41.1 % (ref 35–47)
HGB BLD-MCNC: 13.7 G/DL (ref 11.7–15.7)
IMM GRANULOCYTES # BLD: 0 10E9/L (ref 0–0.4)
IMM GRANULOCYTES NFR BLD: 0.2 %
LYMPHOCYTES # BLD AUTO: 2.2 10E9/L (ref 0.8–5.3)
LYMPHOCYTES NFR BLD AUTO: 39.5 %
MCH RBC QN AUTO: 31.4 PG (ref 26.5–33)
MCHC RBC AUTO-ENTMCNC: 33.3 G/DL (ref 31.5–36.5)
MCV RBC AUTO: 94 FL (ref 78–100)
MONOCYTES # BLD AUTO: 0.7 10E9/L (ref 0–1.3)
MONOCYTES NFR BLD AUTO: 12.9 %
NEUTROPHILS # BLD AUTO: 2.5 10E9/L (ref 1.6–8.3)
NEUTROPHILS NFR BLD AUTO: 46.1 %
NRBC # BLD AUTO: 0 10*3/UL
NRBC BLD AUTO-RTO: 0 /100
PLATELET # BLD AUTO: 222 10E9/L (ref 150–450)
POTASSIUM SERPL-SCNC: 3.8 MMOL/L (ref 3.4–5.3)
PROT SERPL-MCNC: 7.3 G/DL (ref 6.8–8.8)
RBC # BLD AUTO: 4.37 10E12/L (ref 3.8–5.2)
SODIUM SERPL-SCNC: 139 MMOL/L (ref 133–144)
TRIGL SERPL-MCNC: 68 MG/DL
WBC # BLD AUTO: 5.4 10E9/L (ref 4–11)

## 2018-09-26 RX ORDER — ISOTRETINOIN 40 MG/1
40 CAPSULE ORAL DAILY
Qty: 30 CAPSULE | Refills: 0 | Status: SHIPPED | OUTPATIENT
Start: 2018-09-26 | End: 2018-10-24 | Stop reason: DRUGHIGH

## 2018-09-26 ASSESSMENT — PAIN SCALES - GENERAL: PAINLEVEL: NO PAIN (0)

## 2018-09-26 NOTE — LETTER
9/26/2018       RE: Zeeshan Feng  [address]  [facility] [address]     Dear Colleague,    Thank you for referring your patient, Zeeshan Feng, to the [facility]. Please see a copy of my visit note below.    [facility] Dermatology Note      Dermatology Problem List:  1.Acne Vulgaris- started isotretinoin 40mg po QD 8/24/18, end of month #1  -previous txs: tretinoin 0.025% QHS, clindamycin 1% lotion every day  2.Hyperpigmetnation- tretinoin 0.025%, hydroquinone 4% BID - holding these right now due to tx with isotretinoin    CC:   Chief Complaint   Patient presents with     Accutane     Zeeshan is here today for an accutane check.          Encounter Date: Sep 26, 2018    History of Present Illness:  Ms. Zeeshan Feng is a 38 year old female who returns to the dermatology clinic. She has noticed positive skin changes so far after only 1mo of isotretinoin. She said her face had worse breakouts initially, but the have since calmed own. Her skin is very dry, especially the lips/mouth. The patient reports tolerable mucocutaneous dryness, and denies arthralgias, myalgias, depression, suicidal ideation, diarrhea, headache, or blurred vision.      Currently using Lashae daily moisturizer. It does not seem to be helping enough.    She notes her periods have changed. She has noticed more irregularity in her periods. She says this has happened before a long time ago, but she is not sure why. She is wondering if it is from the medication.     Past Medical History:   There is no problem list on file for this patient.    History reviewed. No pertinent past medical history.  History reviewed. No pertinent surgical history.    Social History:  The patient works. Not currently sexually active at all.      Family History:  Not obtained today.    Medications:  Current Outpatient Prescriptions   Medication Sig Dispense Refill     ISOtretinoin (ACCUTANE) 40 MG  capsule Take 1 capsule (40 mg) by mouth daily 30 capsule 0     No Known Allergies      Review of Systems:  -Neuro: no HA or vision changes  -GI: No nausea, blood in stool, diarrhea, hx of IBD  -Psych: no depression/anxiety, mood changes, or sleep problems   -Musculoskeletal: no joint or muscle pain or swelling   -Heme/Lymph: no concerning bumps, no bleeding problems  -Constitutional: The patient denies fatigue, fevers, chills, unintended weight loss, and night sweats.  -: increased frequency of menstruation  -Skin: As above in HPI. No additional skin concerns.    Physical exam:  Vitals: 130lbs, 59.1kg  GEN: This is a well developed, well-nourished female in no acute distress, in a pleasant mood.    SKIN: Acne exam, which includes the face, neck, upper central chest, and upper central back was performed.  -There are superifical acneiform papules with intermixed open and closed comedones on the face.   -some erythema noted on the face and lips are slightly xerotic  -No other lesions of concern on areas examined.     Impression/Plan:  1. Acne vulgaris  Edu on avoidance of alcohol, waxing, skin lasers, tattoos, and blood donation. Reminded patient of risks regarding pregnancy. Discussed iPledge and need to  medication within 7 days of this visit. Advised to d/c all other acne medication.   At this visit, we will continue isotretinoin 40mg daily pending negative pregnancy test. One month supply with no refills provided. Goal dose is 150-220mg/kg for this 59.1kg patient.    Labs including CBC, GGT, BUN/Cr, fasting lipids and AST/ALT will be obtained.   Qualitative hCG was also obtained  Contraception: Abstinence and none  Total cumulative dose 20.3mg/kg.   Patient's iPledge # is [id number]    CC Dr. Gomez on close of this encounter.  Follow-up in 1 month, earlier for new or changing lesions.       Staff Involved:  Staff Only  All risks, benefits and alternatives were discussed with patient.  Patient is in  agreement and understands the assessment and plan.  All questions were answered.    Lisbet Haq PA-C  [facility]: Phone: [phone], Fax: [phone]

## 2018-09-26 NOTE — PROGRESS NOTES
[facility] Dermatology Note      Dermatology Problem List:  1.Acne Vulgaris- started isotretinoin 40mg po QD 8/24/18, end of month #1  -previous txs: tretinoin 0.025% QHS, clindamycin 1% lotion every day  2.Hyperpigmetnation- tretinoin 0.025%, hydroquinone 4% BID - holding these right now due to tx with isotretinoin    CC:   Chief Complaint   Patient presents with     Varsha Byers is here today for an accutane check.          Encounter Date: Sep 26, 2018    History of Present Illness:  Ms. Zeeshan Feng is a 38 year old female who returns to the dermatology clinic. She has noticed positive skin changes so far after only 1mo of isotretinoin. She said her face had worse breakouts initially, but the have since calmed own. Her skin is very dry, especially the lips/mouth. The patient reports tolerable mucocutaneous dryness, and denies arthralgias, myalgias, depression, suicidal ideation, diarrhea, headache, or blurred vision.      Currently using Lashae daily moisturizer. It does not seem to be helping enough.    She notes her periods have changed. She has noticed more irregularity in her periods. She says this has happened before a long time ago, but she is not sure why. She is wondering if it is from the medication.     Past Medical History:   There is no problem list on file for this patient.    History reviewed. No pertinent past medical history.  History reviewed. No pertinent surgical history.    Social History:  The patient works. Not currently sexually active at all.      Family History:  Not obtained today.    Medications:  Current Outpatient Prescriptions   Medication Sig Dispense Refill     ISOtretinoin (ACCUTANE) 40 MG capsule Take 1 capsule (40 mg) by mouth daily 30 capsule 0     No Known Allergies      Review of Systems:  -Neuro: no HA or vision changes  -GI: No nausea, blood in stool, diarrhea, hx of IBD  -Psych: no depression/anxiety, mood changes, or sleep problems   -Musculoskeletal:  no joint or muscle pain or swelling   -Heme/Lymph: no concerning bumps, no bleeding problems  -Constitutional: The patient denies fatigue, fevers, chills, unintended weight loss, and night sweats.  -: increased frequency of menstruation  -Skin: As above in HPI. No additional skin concerns.    Physical exam:  Vitals: 130lbs, 59.1kg  GEN: This is a well developed, well-nourished female in no acute distress, in a pleasant mood.    SKIN: Acne exam, which includes the face, neck, upper central chest, and upper central back was performed.  -There are superifical acneiform papules with intermixed open and closed comedones on the face.   -some erythema noted on the face and lips are slightly xerotic  -No other lesions of concern on areas examined.     Impression/Plan:  1. Acne vulgaris  Edu on avoidance of alcohol, waxing, skin lasers, tattoos, and blood donation. Reminded patient of risks regarding pregnancy. Discussed iPledge and need to  medication within 7 days of this visit. Advised to d/c all other acne medication.   At this visit, we will continue isotretinoin 40mg daily pending negative pregnancy test. One month supply with no refills provided. Goal dose is 150-220mg/kg for this 59.1kg patient.    Labs including CBC, GGT, BUN/Cr, fasting lipids and AST/ALT will be obtained.   Qualitative hCG was also obtained  Contraception: Abstinence and none  Total cumulative dose 20.3mg/kg.   Patient's iPledge # is [id number]    CC Dr. Gomez on close of this encounter.  Follow-up in 1 month, earlier for new or changing lesions.       Staff Involved:  Staff Only  All risks, benefits and alternatives were discussed with patient.  Patient is in agreement and understands the assessment and plan.  All questions were answered.    Lisbet Haq PA-C  [facility]: Phone: [phone], Fax: [phone]

## 2018-09-26 NOTE — NURSING NOTE
Dermatology Rooming Note    Zeeshan Feng's goals for this visit include:   Chief Complaint   Patient presents with     Accutane     Tensue is here today for an accutane check.      FRANCY Alvarado

## 2018-09-26 NOTE — MR AVS SNAPSHOT
After Visit Summary   9/26/2018    Zeeshan Feng    MRN: [id number]           Patient Information     Date Of Birth          1980        Visit Information        Provider Department      9/26/2018 5:30 PM Lisbet Haq PA-C [facility] Dermatology        Today's Diagnoses     On isotretinoin therapy    -  1    Acne vulgaris          Care Instructions    CeraVe Moisturizing Cream ----> tub  Aquaphor - ointment          Follow-ups after your visit        Follow-up notes from your care team     Return in about 4 weeks (around 10/24/2018).      Your next 10 appointments already scheduled     Sep 26, 2018  6:00 PM CDT   LAB with  LAB   [facility] Lab ([facility])    [address] [place] [address] [phone]           Please do not eat 10-12 hours before your appointment if you are coming in fasting for labs on lipids, cholesterol, or glucose (sugar). This does not apply to pregnant women. Water, hot tea and black coffee (with nothing added) are okay. Do not drink other fluids, diet soda or chew gum.            Oct 24, 2018  2:30 PM CDT   (Arrive by 2:15 PM)   Return Visit with SILVIA iDaz [facility] Dermatology ([facility])    [address] [place] [address] [phone]              Who to contact     Please call your clinic at [phone] to:    Ask questions about your health    Make or cancel appointments    Discuss your medicines    Learn about your test results    Speak to your doctor            Additional Information About Your Visit        MyChart Information     [employer] gives you secure access to your electronic health record. If you see a primary care provider, you can also send messages to your care team and make appointments. If you have questions, please call your primary care clinic.  If you do not have a primary care provider, please call [phone] and they will assist  you.      [employer] is an electronic gateway that provides easy, online access to your medical records. With [employer], you can request a clinic appointment, read your test results, renew a prescription or communicate with your care team.     To access your existing account, please contact your [facility] or call [phone] for assistance.        Care EveryWhere ID     This is your Care EveryWhere ID. This could be used by other organizations to access your [place] medical records  [id number]         Blood Pressure from Last 3 Encounters:   No data found for BP    Weight from Last 3 Encounters:   No data found for Wt              We Performed the Following     HCG Qual, Urine - [facility]  [facility]  ([id number])          Where to get your medicines      These medications were sent to [place] [address] [place] [facility] [address] [facility]  [address] [facility] [address]     Phone:  [phone]     ISOtretinoin 40 MG capsule          Primary Care Provider Fax #    Physician No Ref-Primary [phone]       No address on file        Equal Access to Services     ADELITA SZYMANSKI : Hadii aad ku hadasho Soomaali, waaxda luqadaha, qaybta kaalmada adeegyada, waxay idiin haywilliamsn lilian valentino . So [facility] [phone].    ATENCIÓN: Si habla español, tiene a beltre disposición servicios gratuitos de asistencia lingüística. Llame al [phone].    We comply with applicable federal civil rights laws and [place] laws. We do not discriminate on the basis of race, color, national origin, age, disability, sex, sexual orientation, or gender identity.            Thank you!     Thank you for choosing [facility] DERMATOLOGY  for your care. Our goal is always to provide you with excellent care. Hearing back from our patients is one way we can continue to improve our services. Please take a few minutes to complete the written survey that you may receive in the mail after your visit with us. Thank  you!             Your Updated Medication List - Protect others around you: Learn how to safely use, store and throw away your medicines at www.disposemymeds.org.          This list is accurate as of 9/26/18  5:54 PM.  Always use your most recent med list.                   Brand Name Dispense Instructions for use Diagnosis    ISOtretinoin 40 MG capsule    ACCUTANE    30 capsule    Take 1 capsule (40 mg) by mouth daily    Acne vulgaris

## 2018-10-24 ENCOUNTER — OFFICE VISIT (OUTPATIENT)
Dept: DERMATOLOGY | Facility: CLINIC | Age: 38
End: 2018-10-24
Payer: COMMERCIAL

## 2018-10-24 DIAGNOSIS — Z79.899 ON ISOTRETINOIN THERAPY: ICD-10-CM

## 2018-10-24 DIAGNOSIS — L70.0 ACNE VULGARIS: Primary | ICD-10-CM

## 2018-10-24 LAB
ALBUMIN SERPL-MCNC: 3.8 G/DL (ref 3.4–5)
ALP SERPL-CCNC: 72 U/L (ref 40–150)
ALT SERPL W P-5'-P-CCNC: 41 U/L (ref 0–50)
ANION GAP SERPL CALCULATED.3IONS-SCNC: 7 MMOL/L (ref 3–14)
AST SERPL W P-5'-P-CCNC: 24 U/L (ref 0–45)
BASOPHILS # BLD AUTO: 0 10E9/L (ref 0–0.2)
BASOPHILS NFR BLD AUTO: 0.6 %
BILIRUB SERPL-MCNC: 0.4 MG/DL (ref 0.2–1.3)
BUN SERPL-MCNC: 6 MG/DL (ref 7–30)
CALCIUM SERPL-MCNC: 8.9 MG/DL (ref 8.5–10.1)
CHLORIDE SERPL-SCNC: 108 MMOL/L (ref 94–109)
CO2 SERPL-SCNC: 25 MMOL/L (ref 20–32)
CREAT SERPL-MCNC: 0.9 MG/DL (ref 0.52–1.04)
DIFFERENTIAL METHOD BLD: ABNORMAL
EOSINOPHIL # BLD AUTO: 0 10E9/L (ref 0–0.7)
EOSINOPHIL NFR BLD AUTO: 0.6 %
ERYTHROCYTE [DISTWIDTH] IN BLOOD BY AUTOMATED COUNT: 15.2 % (ref 10–15)
GFR SERPL CREATININE-BSD FRML MDRD: 71 ML/MIN/1.7M2
GGT SERPL-CCNC: 26 U/L (ref 0–40)
GLUCOSE SERPL-MCNC: 77 MG/DL (ref 70–99)
HCG UR QL: NEGATIVE
HCT VFR BLD AUTO: 39.2 % (ref 35–47)
HGB BLD-MCNC: 13.1 G/DL (ref 11.7–15.7)
IMM GRANULOCYTES # BLD: 0 10E9/L (ref 0–0.4)
IMM GRANULOCYTES NFR BLD: 0.2 %
LYMPHOCYTES # BLD AUTO: 2.4 10E9/L (ref 0.8–5.3)
LYMPHOCYTES NFR BLD AUTO: 45.7 %
MCH RBC QN AUTO: 31.1 PG (ref 26.5–33)
MCHC RBC AUTO-ENTMCNC: 33.4 G/DL (ref 31.5–36.5)
MCV RBC AUTO: 93 FL (ref 78–100)
MONOCYTES # BLD AUTO: 0.4 10E9/L (ref 0–1.3)
MONOCYTES NFR BLD AUTO: 8.4 %
NEUTROPHILS # BLD AUTO: 2.3 10E9/L (ref 1.6–8.3)
NEUTROPHILS NFR BLD AUTO: 44.5 %
NRBC # BLD AUTO: 0 10*3/UL
NRBC BLD AUTO-RTO: 0 /100
PLATELET # BLD AUTO: 213 10E9/L (ref 150–450)
POTASSIUM SERPL-SCNC: 4.3 MMOL/L (ref 3.4–5.3)
PROT SERPL-MCNC: 7.6 G/DL (ref 6.8–8.8)
RBC # BLD AUTO: 4.21 10E12/L (ref 3.8–5.2)
SODIUM SERPL-SCNC: 140 MMOL/L (ref 133–144)
TRIGL SERPL-MCNC: 60 MG/DL
WBC # BLD AUTO: 5.2 10E9/L (ref 4–11)

## 2018-10-24 RX ORDER — ISOTRETINOIN 30 MG/1
60 CAPSULE ORAL DAILY
Qty: 60 CAPSULE | Refills: 0 | Status: SHIPPED | OUTPATIENT
Start: 2018-10-24 | End: 2018-12-05

## 2018-10-24 ASSESSMENT — PAIN SCALES - GENERAL: PAINLEVEL: NO PAIN (0)

## 2018-10-24 NOTE — MR AVS SNAPSHOT
After Visit Summary   10/24/2018    Zeeshan Feng    MRN: [id number]           Patient Information     Date Of Birth          1980        Visit Information        Provider Department      10/24/2018 2:30 PM Lisbet Haq PA-C [facility] Dermatology        Today's Diagnoses     Acne vulgaris    -  1    On isotretinoin therapy           Follow-ups after your visit        Follow-up notes from your care team     Return in about 4 weeks (around 11/21/2018).      Your next 10 appointments already scheduled     Dec 05, 2018 12:45 PM CST   (Arrive by 12:30 PM)   Return Visit with SILVIA Diaz [facility] Dermatology ([facility])    [address] [address] [phone]              Who to contact     Please call your clinic at [phone] to:    Ask questions about your health    Make or cancel appointments    Discuss your medicines    Learn about your test results    Speak to your doctor            Additional Information About Your Visit        [employer] Information     [employer] gives you secure access to your electronic health record. If you see a primary care provider, you can also send messages to your care team and make appointments. If you have questions, please call your primary care clinic.  If you do not have a primary care provider, please call [phone] and they will assist you.      [employer] is an electronic gateway that provides easy, online access to your medical records. With [employer], you can request a clinic appointment, read your test results, renew a prescription or communicate with your care team.     To access your existing account, please contact your [facility] or call [phone] for assistance.        Care EveryWhere ID     This is your Care EveryWhere ID. This could be used by other organizations to access your [place] medical records  [id number]         Blood Pressure from Last 3  Encounters:   No data found for BP    Weight from Last 3 Encounters:   No data found for Wt              We Performed the Following     HCG Qual, Urine - NICOLLE,  Bebe Paige  ([id number])          Today's Medication Changes          These changes are accurate as of 10/24/18  5:07 PM.  If you have any questions, ask your nurse or doctor.               These medicines have changed or have updated prescriptions.        Dose/Directions    ISOtretinoin 30 MG Caps   This may have changed:    - medication strength  - how much to take   Used for:  Acne vulgaris   Changed by:  Lisbet Haq PA-C        Dose:  60 mg   Take 60 mg by mouth daily   Quantity:  60 capsule   Refills:  0            Where to get your medicines      These medications were sent to [place] [address] [place], [place] - [address] [facility]  [address] [facility] [address]     Phone:  [phone]     ISOtretinoin 30 MG Caps                Primary Care Provider Fax #    Physician No Ref-Primary [phone]       No address on file        Equal Access to Services     NEENA [facility] Center[facility] : Hadii jose johnson Somikal, waaxda luqadaha, qaybta kaalmada adetobiasyada, julian valentino . So [facility] [phone].    ATENCIÓN: Si habla español, tiene a beltre disposición servicios gratuitos de asistencia lingüística. Llame al [phone].    We comply with applicable federal civil rights laws and [place] laws. We do not discriminate on the basis of race, color, national origin, age, disability, sex, sexual orientation, or gender identity.            Thank you!     Thank you for choosing [facility] DERMATOLOGY  for your care. Our goal is always to provide you with excellent care. Hearing back from our patients is one way we can continue to improve our services. Please take a few minutes to complete the written survey that you may receive in the mail after your visit with us. Thank you!             Your Updated Medication List - Protect others  around you: Learn how to safely use, store and throw away your medicines at www.disposemymeds.org.          This list is accurate as of 10/24/18  5:07 PM.  Always use your most recent med list.                   Brand Name Dispense Instructions for use Diagnosis    ISOtretinoin 30 MG Caps     60 capsule    Take 60 mg by mouth daily    Acne vulgaris

## 2018-10-24 NOTE — LETTER
10/24/2018       RE: Zeeshan Feng  [address]  [facility] [address]     Dear Colleague,    Thank you for referring your patient, Zeeshan Feng, to the [facility] DERMATOLOGY at [facility]. Please see a copy of my visit note below.    [facility] Dermatology Note      Dermatology Problem List:  1.Acne Vulgaris-  isotretinoin to 60mg po QD, end of month #2  -previous txs: tretinoin 0.025% QHS, clindamycin 1% lotion every day  2.Hyperpigmetnation- tretinoin 0.025%, hydroquinone 4% BID - holding these right now due to tx with isotretinoin    CC:   No chief complaint on file.        Encounter Date: Oct 24, 2018    History of Present Illness:  Ms. Zeeshan Feng is a 38 year old female who returns to the dermatology clinic regarding acne. The patient has just finished month #2 of isotretinoin 40mg daily. The patient that her acne is doing a lot better and she has not been seeing any new spots. The patient states that she has had some headaches but nothing has been consistent or severe. Patient states that she hasn't been having bowel movements as frequently lately.  The patient reports tolerable mucocutaneous dryness, and denies arthralgias, myalgias, depression, suicidal ideation, diarrhea, or blurred vision.        Past Medical History:   There is no problem list on file for this patient.    No past medical history on file.  No past surgical history on file.    Social History:  The patient works. Not currently sexually active at all.      Family History:  Not obtained today.    Medications:  Current Outpatient Prescriptions   Medication Sig Dispense Refill     ISOtretinoin (ACCUTANE) 40 MG capsule Take 1 capsule (40 mg) by mouth daily 30 capsule 0     No Known Allergies      Review of Systems:  -Neuro: no HA or vision changes  -GI: No nausea, blood in stool, diarrhea, hx of IBD  -Psych: no depression/anxiety, mood changes, or sleep problems   -Musculoskeletal: no joint or  muscle pain or swelling   -Heme/Lymph: no concerning bumps, no bleeding problems  -Constitutional: The patient denies fatigue, fevers, chills, unintended weight loss, and night sweats.  -: increased frequency of menstruation  -Skin: As above in HPI. No additional skin concerns.    Physical exam:  Vitals: 130lbs, 59.1kg  GEN: This is a well developed, well-nourished female in no acute distress, in a pleasant mood.    SKIN: Acne exam, which includes the face, neck, upper central chest, and upper central back was performed.  -There are superifical acneiform papules with intermixed open and closed comedones on the face.   -some erythema noted on the face and lips are slightly xerotic  -No other lesions of concern on areas examined.     Impression/Plan:  1. Acne vulgaris  Edu on avoidance of alcohol, waxing, skin lasers, tattoos, and blood donation. Reminded patient of risks regarding pregnancy. Discussed iPledge and need to  medication within 7 days of this visit. Advised to d/c all other acne medication.   At this visit, we will increase isotretinoin to 60mg daily pending negative pregnancy test. One month supply with no refills provided. Goal dose is 150-220mg/kg for this 59.1kg patient.    Labs including CBC, GGT, BUN/Cr, lipids and AST/ALT will be obtained.   Qualitative hCG was also obtained  Contraception: Abstinence and none  Total cumulative dose 40.6 mg/kg.   Patient's iPledge # is [id number]    CC Dr. Gomez on close of this encounter.  Follow-up in 1 month, earlier for new or changing lesions.       Staff Involved:    Scribe Disclosure  I, Ela Caro, am serving as a scribe to document services personally performed by Lisbet Haq PA-C, based on data collection and the provider's statements to me.     Provider Disclosure:   The documentation recorded by the scribe accurately reflects the services I personally performed and the decisions made by me.    All risks, benefits and alternatives were  discussed with patient.  Patient is in agreement and understands the assessment and plan.  All questions were answered.    Lisbet Haq PA-C  [facility]: Phone: [phone], Fax: [phone]

## 2018-10-24 NOTE — PROGRESS NOTES
[facility] Dermatology Note      Dermatology Problem List:  1.Acne Vulgaris-  isotretinoin to 60mg po QD, end of month #2  -previous txs: tretinoin 0.025% QHS, clindamycin 1% lotion every day  2.Hyperpigmetnation- tretinoin 0.025%, hydroquinone 4% BID - holding these right now due to tx with isotretinoin    CC:   No chief complaint on file.        Encounter Date: Oct 24, 2018    History of Present Illness:  Ms. Zeeshan Feng is a 38 year old female who returns to the dermatology clinic regarding acne. The patient has just finished month #2 of isotretinoin 40mg daily. The patient that her acne is doing a lot better and she has not been seeing any new spots. The patient states that she has had some headaches but nothing has been consistent or severe. Patient states that she hasn't been having bowel movements as frequently lately.  The patient reports tolerable mucocutaneous dryness, and denies arthralgias, myalgias, depression, suicidal ideation, diarrhea, or blurred vision.        Past Medical History:   There is no problem list on file for this patient.    No past medical history on file.  No past surgical history on file.    Social History:  The patient works. Not currently sexually active at all.      Family History:  Not obtained today.    Medications:  Current Outpatient Prescriptions   Medication Sig Dispense Refill     ISOtretinoin (ACCUTANE) 40 MG capsule Take 1 capsule (40 mg) by mouth daily 30 capsule 0     No Known Allergies      Review of Systems:  -Neuro: no HA or vision changes  -GI: No nausea, blood in stool, diarrhea, hx of IBD  -Psych: no depression/anxiety, mood changes, or sleep problems   -Musculoskeletal: no joint or muscle pain or swelling   -Heme/Lymph: no concerning bumps, no bleeding problems  -Constitutional: The patient denies fatigue, fevers, chills, unintended weight loss, and night sweats.  -: increased frequency of menstruation  -Skin: As above in HPI. No additional skin  concerns.    Physical exam:  Vitals: 130lbs, 59.1kg  GEN: This is a well developed, well-nourished female in no acute distress, in a pleasant mood.    SKIN: Acne exam, which includes the face, neck, upper central chest, and upper central back was performed.  -There are superifical acneiform papules with intermixed open and closed comedones on the face.   -some erythema noted on the face and lips are slightly xerotic  -No other lesions of concern on areas examined.     Impression/Plan:  1. Acne vulgaris  Edu on avoidance of alcohol, waxing, skin lasers, tattoos, and blood donation. Reminded patient of risks regarding pregnancy. Discussed iPledge and need to  medication within 7 days of this visit. Advised to d/c all other acne medication.   At this visit, we will increase isotretinoin to 60mg daily pending negative pregnancy test. One month supply with no refills provided. Goal dose is 150-220mg/kg for this 59.1kg patient.    Labs including CBC, GGT, BUN/Cr, lipids and AST/ALT will be obtained.   Qualitative hCG was also obtained  Contraception: Abstinence and none  Total cumulative dose 40.6 mg/kg.   Patient's iPledge # is [id number]    CC Dr. Gomez on close of this encounter.  Follow-up in 1 month, earlier for new or changing lesions.       Staff Involved:    Scribe Disclosure  I, Ela Caro, am serving as a scribe to document services personally performed by Lisbet Haq PA-C, based on data collection and the provider's statements to me.     Provider Disclosure:   The documentation recorded by the scribe accurately reflects the services I personally performed and the decisions made by me.    All risks, benefits and alternatives were discussed with patient.  Patient is in agreement and understands the assessment and plan.  All questions were answered.    Lisbet Haq PA-C  [facility] [place]: Phone: [phone], Fax:  [phone]

## 2018-12-05 ENCOUNTER — OFFICE VISIT (OUTPATIENT)
Dept: DERMATOLOGY | Facility: CLINIC | Age: 38
End: 2018-12-05
Payer: COMMERCIAL

## 2018-12-05 DIAGNOSIS — Z79.899 ON ISOTRETINOIN THERAPY: ICD-10-CM

## 2018-12-05 DIAGNOSIS — L81.0 POST-INFLAMMATORY HYPERPIGMENTATION: ICD-10-CM

## 2018-12-05 DIAGNOSIS — L85.3 XEROSIS OF SKIN: ICD-10-CM

## 2018-12-05 DIAGNOSIS — L81.1 MELASMA: ICD-10-CM

## 2018-12-05 DIAGNOSIS — L70.0 ACNE VULGARIS: Primary | ICD-10-CM

## 2018-12-05 LAB
ALBUMIN SERPL-MCNC: 3.2 G/DL (ref 3.4–5)
ALP SERPL-CCNC: 73 U/L (ref 40–150)
ALT SERPL W P-5'-P-CCNC: 23 U/L (ref 0–50)
ANION GAP SERPL CALCULATED.3IONS-SCNC: 9 MMOL/L (ref 3–14)
AST SERPL W P-5'-P-CCNC: 19 U/L (ref 0–45)
BASOPHILS # BLD AUTO: 0 10E9/L (ref 0–0.2)
BASOPHILS NFR BLD AUTO: 0.5 %
BILIRUB SERPL-MCNC: 0.3 MG/DL (ref 0.2–1.3)
BUN SERPL-MCNC: 7 MG/DL (ref 7–30)
CALCIUM SERPL-MCNC: 8.4 MG/DL (ref 8.5–10.1)
CHLORIDE SERPL-SCNC: 107 MMOL/L (ref 94–109)
CO2 SERPL-SCNC: 23 MMOL/L (ref 20–32)
CREAT SERPL-MCNC: 0.92 MG/DL (ref 0.52–1.04)
DIFFERENTIAL METHOD BLD: ABNORMAL
EOSINOPHIL # BLD AUTO: 0 10E9/L (ref 0–0.7)
EOSINOPHIL NFR BLD AUTO: 0.6 %
ERYTHROCYTE [DISTWIDTH] IN BLOOD BY AUTOMATED COUNT: 15.3 % (ref 10–15)
GFR SERPL CREATININE-BSD FRML MDRD: 68 ML/MIN/1.7M2
GGT SERPL-CCNC: 25 U/L (ref 0–40)
GLUCOSE SERPL-MCNC: 85 MG/DL (ref 70–99)
HCG UR QL: NEGATIVE
HCT VFR BLD AUTO: 37.9 % (ref 35–47)
HGB BLD-MCNC: 12.6 G/DL (ref 11.7–15.7)
IMM GRANULOCYTES # BLD: 0 10E9/L (ref 0–0.4)
IMM GRANULOCYTES NFR BLD: 0.2 %
LYMPHOCYTES # BLD AUTO: 2.7 10E9/L (ref 0.8–5.3)
LYMPHOCYTES NFR BLD AUTO: 42.3 %
MCH RBC QN AUTO: 31 PG (ref 26.5–33)
MCHC RBC AUTO-ENTMCNC: 33.2 G/DL (ref 31.5–36.5)
MCV RBC AUTO: 93 FL (ref 78–100)
MONOCYTES # BLD AUTO: 0.4 10E9/L (ref 0–1.3)
MONOCYTES NFR BLD AUTO: 6.6 %
NEUTROPHILS # BLD AUTO: 3.2 10E9/L (ref 1.6–8.3)
NEUTROPHILS NFR BLD AUTO: 49.8 %
NRBC # BLD AUTO: 0 10*3/UL
NRBC BLD AUTO-RTO: 0 /100
PLATELET # BLD AUTO: 252 10E9/L (ref 150–450)
POTASSIUM SERPL-SCNC: 3.6 MMOL/L (ref 3.4–5.3)
PROT SERPL-MCNC: 6.7 G/DL (ref 6.8–8.8)
RBC # BLD AUTO: 4.06 10E12/L (ref 3.8–5.2)
SODIUM SERPL-SCNC: 139 MMOL/L (ref 133–144)
TRIGL SERPL-MCNC: 72 MG/DL
WBC # BLD AUTO: 6.5 10E9/L (ref 4–11)

## 2018-12-05 RX ORDER — ISOTRETINOIN 30 MG/1
60 CAPSULE ORAL DAILY
Qty: 60 CAPSULE | Refills: 0 | Status: SHIPPED | OUTPATIENT
Start: 2018-12-05 | End: 2019-01-02

## 2018-12-05 ASSESSMENT — PAIN SCALES - GENERAL: PAINLEVEL: NO PAIN (0)

## 2018-12-05 NOTE — LETTER
"12/5/2018       RE: Zeeshan Feng  [address]  [facility] [address]     Dear Colleague,    Thank you for referring your patient, Zeeshan Feng, to the [facility] DERMATOLOGY at [facility]. Please see a copy of my visit note below.    [facility] Dermatology Note      Dermatology Problem List:  1.Acne Vulgaris-  isotretinoin to 60mg po QD, end of month #3  -previous txs: tretinoin 0.025% QHS, clindamycin 1% lotion every day  2.Hyperpigmetnation/melasma- tretinoin 0.025%, hydroquinone 4% BID - holding these right now due to tx with isotretinoin    CC:   Chief Complaint   Patient presents with     Accutane     Zeeshan is here today for an accutane follow up.          Encounter Date: Dec 5, 2018    History of Present Illness:  Ms. Zeeshan Feng is a 38 year old female who returns to the dermatology clinic regarding acne. The patient has just finished month #3. The patient that her acne has been improving. The patient states that her skin is getting dry and \"darker\" above her lip again. Sh previously was on tretinoin and hydroquinone to help treat her melasma/hyperpigmentation but is holding these now due to use of isotretinoin. She has been applying a lot of Vaseline to reduce the dryness. The patient reports tolerable mucocutaneous dryness, and denies arthralgias, myalgias, depression, suicidal ideation, diarrhea, or blurred vision.        Past Medical History:   There is no problem list on file for this patient.    History reviewed. No pertinent past medical history.  History reviewed. No pertinent surgical history.    Social History:  The patient works. Not currently sexually active at all.      Family History:  Not obtained today.    Medications:  Current Outpatient Prescriptions   Medication Sig Dispense Refill     ISOtretinoin 30 MG CAPS Take 60 mg by mouth daily 60 capsule 0     No Known Allergies      Review of Systems:  -Neuro: no HA or vision changes  -GI: No " nausea, blood in stool, diarrhea, hx of IBD  -Psych: no depression/anxiety, mood changes, or sleep problems   -Musculoskeletal: no joint or muscle pain or swelling   -Heme/Lymph: no concerning bumps, no bleeding problems  -Constitutional: The patient denies fatigue, fevers, chills, unintended weight loss, and night sweats.  -: increased frequency of menstruation  -Skin: As above in HPI. No additional skin concerns.    Physical exam:  Vitals: 130lbs, 59.1kg  GEN: This is a well developed, well-nourished female in no acute distress, in a pleasant mood.    SKIN: Acne exam, which includes the face, neck, upper central chest, and upper central back was performed.  -There are superifical acneiform papules with intermixed open and closed comedones on the face. Overall improved however. No active papules today.  -lower face and lips slightly xerotic  -Moderate hyperpigmentation above the upper lip and on the lower face  -No other lesions of concern on areas examined.     Impression/Plan:  1. Hyperpigmentation/melasma of the upper lip/lower face - previously was on tretinoin and hydroquinone - may be slightly worsened currently because patient is also a bit xerotic due to isotretinoin    Patient to continue with bland emollients several times daily    Continue with photoprotection, at least SPF 30 daily    Will consider adding hydroquinone and tretinoin again at a later time if needed - she had used these in the past and is currently holding them now due to isotretinoin    2. Acne vulgaris  Edu on avoidance of alcohol, waxing, skin lasers, tattoos, and blood donation. Reminded patient of risks regarding pregnancy. Discussed iPledge and need to  medication within 7 days of this visit. Advised to d/c all other acne medication.   At this visit, we will continue isotretinoin 60mg daily pending negative pregnancy test. One month supply with no refills provided. Goal dose is 150-220mg/kg for this 59.1kg patient.    Labs  including CBC, GGT, BUN/Cr, lipids and AST/ALT will be obtained. If nml will not need labs going forward.  Qualitative hCG was also obtained  Contraception: Abstinence and none  Total cumulative dose 71 mg/kg.   Patient's iPledge # is [id number]    CC Dr. Gomez on close of this encounter.  Follow-up in 1 month, earlier for new or changing lesions.       Staff Involved:    Scribe Disclosure  I, Ela Caro, am serving as a scribe to document services personally performed by Lisbet Haq PA-C, based on data collection and the provider's statements to me.     Provider Disclosure:   The documentation recorded by the scribe accurately reflects the services I personally performed and the decisions made by me.    All risks, benefits and alternatives were discussed with patient.  Patient is in agreement and understands the assessment and plan.  All questions were answered.    Lisbet Haq PA-C  [facility]: Phone: [phone], Fax: [phone]                                      Again, thank you for allowing me to participate in the care of your patient.      Sincerely,    Lisbet Haq PA-C

## 2018-12-05 NOTE — PROGRESS NOTES
"[facility] Dermatology Note      Dermatology Problem List:  1.Acne Vulgaris-  isotretinoin to 60mg po QD, end of month #3  -previous txs: tretinoin 0.025% QHS, clindamycin 1% lotion every day  2.Hyperpigmetnation/melasma- tretinoin 0.025%, hydroquinone 4% BID - holding these right now due to tx with isotretinoin    CC:   Chief Complaint   Patient presents with     Accutane     Zeeshan is here today for an accutane follow up.          Encounter Date: Dec 5, 2018    History of Present Illness:  Ms. Zeeshan Feng is a 38 year old female who returns to the dermatology clinic regarding acne. The patient has just finished month #3. The patient that her acne has been improving. The patient states that her skin is getting dry and \"darker\" above her lip again. Sh previously was on tretinoin and hydroquinone to help treat her melasma/hyperpigmentation but is holding these now due to use of isotretinoin. She has been applying a lot of Vaseline to reduce the dryness. The patient reports tolerable mucocutaneous dryness, and denies arthralgias, myalgias, depression, suicidal ideation, diarrhea, or blurred vision.        Past Medical History:   There is no problem list on file for this patient.    History reviewed. No pertinent past medical history.  History reviewed. No pertinent surgical history.    Social History:  The patient works. Not currently sexually active at all.      Family History:  Not obtained today.    Medications:  Current Outpatient Prescriptions   Medication Sig Dispense Refill     ISOtretinoin 30 MG CAPS Take 60 mg by mouth daily 60 capsule 0     No Known Allergies      Review of Systems:  -Neuro: no HA or vision changes  -GI: No nausea, blood in stool, diarrhea, hx of IBD  -Psych: no depression/anxiety, mood changes, or sleep problems   -Musculoskeletal: no joint or muscle pain or swelling   -Heme/Lymph: no concerning bumps, no bleeding problems  -Constitutional: The patient denies fatigue, fevers, " chills, unintended weight loss, and night sweats.  -: increased frequency of menstruation  -Skin: As above in HPI. No additional skin concerns.    Physical exam:  Vitals: 130lbs, 59.1kg  GEN: This is a well developed, well-nourished female in no acute distress, in a pleasant mood.    SKIN: Acne exam, which includes the face, neck, upper central chest, and upper central back was performed.  -There are superifical acneiform papules with intermixed open and closed comedones on the face. Overall improved however. No active papules today.  -lower face and lips slightly xerotic  -Moderate hyperpigmentation above the upper lip and on the lower face  -No other lesions of concern on areas examined.     Impression/Plan:  1. Hyperpigmentation/melasma of the upper lip/lower face - previously was on tretinoin and hydroquinone - may be slightly worsened currently because patient is also a bit xerotic due to isotretinoin    Patient to continue with bland emollients several times daily    Continue with photoprotection, at least SPF 30 daily    Will consider adding hydroquinone and tretinoin again at a later time if needed - she had used these in the past and is currently holding them now due to isotretinoin    2. Acne vulgaris  Edu on avoidance of alcohol, waxing, skin lasers, tattoos, and blood donation. Reminded patient of risks regarding pregnancy. Discussed iPledge and need to  medication within 7 days of this visit. Advised to d/c all other acne medication.   At this visit, we will continue isotretinoin 60mg daily pending negative pregnancy test. One month supply with no refills provided. Goal dose is 150-220mg/kg for this 59.1kg patient.    Labs including CBC, GGT, BUN/Cr, lipids and AST/ALT will be obtained. If nml will not need labs going forward.  Qualitative hCG was also obtained  Contraception: Abstinence and none  Total cumulative dose 71 mg/kg.   Patient's iPledge # is [id number]    CC Dr. Gomez on close  of this encounter.  Follow-up in 1 month, earlier for new or changing lesions.       Staff Involved:    Scribe Disclosure  I, Ela Caro, am serving as a scribe to document services personally performed by Lisbet Haq PA-C, based on data collection and the provider's statements to me.     Provider Disclosure:   The documentation recorded by the scribe accurately reflects the services I personally performed and the decisions made by me.    All risks, benefits and alternatives were discussed with patient.  Patient is in agreement and understands the assessment and plan.  All questions were answered.    Lisbet Haq PA-C  [facility]: Phone: [phone], Fax: [phone]

## 2018-12-05 NOTE — NURSING NOTE
Dermatology Rooming Note    Zeeshan Feng's goals for this visit include:   Chief Complaint   Patient presents with     Accutane     Zeeshan is here today for an accutane follow up.      FRANCY Alvarado

## 2018-12-05 NOTE — MR AVS SNAPSHOT
After Visit Summary   12/5/2018    Zeeshan Feng    MRN: [id number]           Patient Information     Date Of Birth          1980        Visit Information        Provider Department      12/5/2018 12:45 PM Lisbet Haq PA-C [facility]        Today's Diagnoses     Acne vulgaris    -  1    On isotretinoin therapy        Post-inflammatory hyperpigmentation        Xerosis of skin        Melasma           Follow-ups after your visit        Follow-up notes from your care team     Return in about 4 weeks (around 1/2/2019).      Your next 10 appointments already scheduled     Jan 02, 2019 12:30 PM CST   (Arrive by 12:15 PM)   Return Visit with SILVIA Truong [facility] ([facility])    [address] [address] [phone]              Who to contact     Please call your clinic at [phone] to:    Ask questions about your health    Make or cancel appointments    Discuss your medicines    Learn about your test results    Speak to your doctor            Additional Information About Your Visit        MyChart Information     [employer] gives you secure access to your electronic health record. If you see a primary care provider, you can also send messages to your care team and make appointments. If you have questions, please call your primary care clinic.  If you do not have a primary care provider, please call [phone] and they will assist you.      [employer] is an electronic gateway that provides easy, online access to your medical records. With [employer], you can request a clinic appointment, read your test results, renew a prescription or communicate with your care team.     To access your existing account, please contact your [facility] or call [phone] for assistance.        Care EveryWhere ID     This is your Care EveryWhere ID. This could be used by other organizations to  access your [place] medical records  [id number]         Blood Pressure from Last 3 Encounters:   No data found for BP    Weight from Last 3 Encounters:   No data found for Wt              We Performed the Following     HCG Qual, Urine - [facility] VALVER[facility] [place]  ([id number])          Where to get your medicines      These medications were sent to [id number] - [place], [place] - [address]  [address] [facility] [address]     Phone:  [phone]     ISOtretinoin 30 MG capsule          Primary Care Provider Fax #    Physician No Ref-Primary [phone]       No address on file        Equal Access to Services     [facility]: Hadii aad ku hadasho Soomaali, waaxda luqadaha, qaybta kaalmada adetobiasyakay, julian valentino . So [facility] [phone].    ATENCIÓN: Si habla español, tiene a beltre disposición servicios gratuitos de asistencia lingüística. Llame al [phone].    We comply with applicable federal civil rights laws and [place] laws. We do not discriminate on the basis of race, color, national origin, age, disability, sex, sexual orientation, or gender identity.            Thank you!     Thank you for choosing [facility] DERMATOLOGY  for your care. Our goal is always to provide you with excellent care. Hearing back from our patients is one way we can continue to improve our services. Please take a few minutes to complete the written survey that you may receive in the mail after your visit with us. Thank you!             Your Updated Medication List - Protect others around you: Learn how to safely use, store and throw away your medicines at www.disposemymeds.org.          This list is accurate as of 12/5/18  1:51 PM.  Always use your most recent med list.                   Brand Name Dispense Instructions for use Diagnosis    ISOtretinoin 30 MG capsule    ABSORICA    60 capsule    Take 2 capsules (60 mg) by mouth daily    Acne vulgaris

## 2019-01-02 ENCOUNTER — OFFICE VISIT (OUTPATIENT)
Dept: DERMATOLOGY | Facility: CLINIC | Age: 39
End: 2019-01-02
Payer: COMMERCIAL

## 2019-01-02 DIAGNOSIS — L70.0 ACNE VULGARIS: ICD-10-CM

## 2019-01-02 DIAGNOSIS — K13.0 CHEILITIS: Primary | ICD-10-CM

## 2019-01-02 DIAGNOSIS — Z79.899 ON ISOTRETINOIN THERAPY: ICD-10-CM

## 2019-01-02 LAB — HCG UR QL: NEGATIVE

## 2019-01-02 RX ORDER — FLUOCINONIDE 0.5 MG/G
OINTMENT TOPICAL 2 TIMES DAILY
Qty: 15 G | Refills: 0 | Status: SHIPPED | OUTPATIENT
Start: 2019-01-02 | End: 2019-10-26

## 2019-01-02 RX ORDER — ISOTRETINOIN 30 MG/1
60 CAPSULE ORAL DAILY
Qty: 60 CAPSULE | Refills: 0 | Status: SHIPPED | OUTPATIENT
Start: 2019-01-02 | End: 2019-02-27

## 2019-01-02 ASSESSMENT — PAIN SCALES - GENERAL: PAINLEVEL: NO PAIN (0)

## 2019-01-02 NOTE — LETTER
1/2/2019       RE: Zeeshan Feng  [address]  [facility] [address]     Dear Colleague,    Thank you for referring your patient, Zeeshan Feng, to the [facility] DERMATOLOGY at [facility]. Please see a copy of my visit note below.    [facility] Dermatology Note      Dermatology Problem List:  1.Acne Vulgaris-  isotretinoin to 60mg po QD, end of month #4  -previous txs: tretinoin 0.025% QHS, clindamycin 1% lotion every day  2.Hyperpigmetnation/melasma- tretinoin 0.025%, hydroquinone 4% BID - holding these right now due to tx with isotretinoin    CC:   Chief Complaint   Patient presents with     Derm Problem     Accutane follow up.      Encounter Date: Jan 2, 2019    History of Present Illness:  Ms. Zeeshan Feng is a 38 year old female who returns to the dermatology clinic regarding acne. The patient was last seen in the dermatology clinic on 12/5/18 during which she continued isotretinoin 60 mg daily. Today she is at the end of month #4. She reports her skin is doing well. Denies any break outs last month.    The patient reports tolerable mucocutaneous dryness, and denies arthralgias, myalgias, depression, suicidal ideation, diarrhea, or blurred vision. She denies sharing the medication or donating blood since her last visit.     Past Medical History:   There is no problem list on file for this patient.    No past medical history on file.  No past surgical history on file.    Social History:  The patient works. Not currently sexually active at all.      Family History:  Not obtained today.    Medications:  Current Outpatient Medications   Medication Sig Dispense Refill     ISOtretinoin (ABSORICA) 30 MG capsule Take 2 capsules (60 mg) by mouth daily 60 capsule 0     No Known Allergies      Review of Systems:  -Neuro: no HA or vision changes  -GI: No nausea, blood in stool, diarrhea, hx of IBD  -Psych: no depression/anxiety, mood changes, or sleep problems    -Musculoskeletal: no joint or muscle pain or swelling   -Heme/Lymph: no concerning bumps, no bleeding problems  -Constitutional: The patient denies fatigue, fevers, chills, unintended weight loss, and night sweats.  -: increased frequency of menstruation  -Skin: As above in HPI. No additional skin concerns.    Physical exam:  Vitals: 130lbs, 59.1kg  GEN: This is a well developed, well-nourished female in no acute distress, in a pleasant mood.    SKIN: Acne exam, which includes the face, neck, upper central chest, and upper central back was performed. Significant for:  -No active acneiform papules or pustules noted to face  - Chest and back are clear  - Mild xerosis noted to lips  - Hyperpigmentation noted to bilateral labial commissures. No active fissuring   -No other lesions of concern on areas examined.     Impression/Plan:  1.Acne vulgaris, on isotretinoin- at the end of month #4  Edu on avoidance of alcohol, waxing, skin lasers, tattoos, and blood donation. Reminded patient of risks regarding pregnancy. Discussed iPledge and need to  medication within 7 days of this visit. Advised to d/c all other acne medication.   Continue isotretinoin 60mg daily pending negative pregnancy test. One month supply with no refills provided. Goal dose is 150-220mg/kg for this 59.1kg patient.    After reviewing in Epic, previous labs wnl. No further labs needed.  Qualitative hCG was obtained, negative.   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     Start fluocinonide 0.05% ointment, apply to lips BID until symptoms resolve     Follow-up in 1 month, earlier for new or changing lesions.     Staff Involved:  Scribe/Staff    Scribe Disclosure:   Shirley WAGONER, am serving as a scribe to document services personally performed by Aurora  Romana VEGA, based on data collection and the provider's statements to me.    Provider Disclosure:   The documentation recorded by the scribe accurately reflects the services I personally performed and the decisions made by me.    All risks, benefits and alternatives were discussed with patient.  Patient is in agreement and understands the assessment and plan.  All questions were answered.  Sun Screen Education was given.   Return to Clinic in 1 months or sooner as needed.   Aurora Avendano PA-C   [facility]                                     Again, thank you for allowing me to participate in the care of your patient.      Sincerely,    Aurora Avendano PA-C

## 2019-01-02 NOTE — LETTER
Date:January 3, 2019      Patient was self referred, no letter generated. Do not send.        [facility] Health Information

## 2019-01-03 NOTE — PROGRESS NOTES
[facility] Dermatology Note      Dermatology Problem List:  1.Acne Vulgaris-  isotretinoin to 60mg po QD, end of month #4  -previous txs: tretinoin 0.025% QHS, clindamycin 1% lotion every day  2.Hyperpigmetnation/melasma- tretinoin 0.025%, hydroquinone 4% BID - holding these right now due to tx with isotretinoin    CC:   Chief Complaint   Patient presents with     Derm Problem     Accutane follow up.      Encounter Date: Jan 2, 2019    History of Present Illness:  Ms. Zeeshan Feng is a 38 year old female who returns to the dermatology clinic regarding acne. The patient was last seen in the dermatology clinic on 12/5/18 during which she continued isotretinoin 60 mg daily. Today she is at the end of month #4. She reports her skin is doing well. Denies any break outs last month.    The patient reports tolerable mucocutaneous dryness, and denies arthralgias, myalgias, depression, suicidal ideation, diarrhea, or blurred vision. She denies sharing the medication or donating blood since her last visit.     Past Medical History:   There is no problem list on file for this patient.    No past medical history on file.  No past surgical history on file.    Social History:  The patient works. Not currently sexually active at all.      Family History:  Not obtained today.    Medications:  Current Outpatient Medications   Medication Sig Dispense Refill     ISOtretinoin (ABSORICA) 30 MG capsule Take 2 capsules (60 mg) by mouth daily 60 capsule 0     No Known Allergies      Review of Systems:  -Neuro: no HA or vision changes  -GI: No nausea, blood in stool, diarrhea, hx of IBD  -Psych: no depression/anxiety, mood changes, or sleep problems   -Musculoskeletal: no joint or muscle pain or swelling   -Heme/Lymph: no concerning bumps, no bleeding problems  -Constitutional: The patient denies fatigue, fevers, chills, unintended weight loss, and night sweats.  -: increased frequency of menstruation  -Skin: As above in  HPI. No additional skin concerns.    Physical exam:  Vitals: 130lbs, 59.1kg  GEN: This is a well developed, well-nourished female in no acute distress, in a pleasant mood.    SKIN: Acne exam, which includes the face, neck, upper central chest, and upper central back was performed. Significant for:  -No active acneiform papules or pustules noted to face  - Chest and back are clear  - Mild xerosis noted to lips  - Hyperpigmentation noted to bilateral labial commissures. No active fissuring   -No other lesions of concern on areas examined.     Impression/Plan:  1.Acne vulgaris, on isotretinoin- at the end of month #4  Edu on avoidance of alcohol, waxing, skin lasers, tattoos, and blood donation. Reminded patient of risks regarding pregnancy. Discussed iPledge and need to  medication within 7 days of this visit. Advised to d/c all other acne medication.   Continue isotretinoin 60mg daily pending negative pregnancy test. One month supply with no refills provided. Goal dose is 150-220mg/kg for this 59.1kg patient.    After reviewing in Epic, previous labs wnl. No further labs needed.  Qualitative hCG was obtained, negative.   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     Start fluocinonide 0.05% ointment, apply to lips BID until symptoms resolve     Follow-up in 1 month, earlier for new or changing lesions.     Staff Involved:  Scribe/Staff    Scribe Disclosure:   CIARAN, Shirley Cormier, am serving as a scribe to document services personally performed by Aurora Avendano PA-C, based on data collection and the provider's statements to me.    Provider Disclosure:   The documentation recorded by the scribe accurately reflects the services I personally performed and the decisions made by me.    All risks, benefits and alternatives were discussed  with patient.  Patient is in agreement and understands the assessment and plan.  All questions were answered.  Sun Screen Education was given.   Return to Clinic in 1 months or sooner as needed.   Aurora Avendano PA-C   [facility]at[facility]

## 2019-01-03 NOTE — NURSING NOTE
Dermatology Rooming Note    Zeeshan Feng's goals for this visit include:   Chief Complaint   Patient presents with     Derm Problem     Accutane follow up.      Amee Gonzalez LPN

## 2019-02-27 ENCOUNTER — OFFICE VISIT (OUTPATIENT)
Dept: DERMATOLOGY | Facility: CLINIC | Age: 39
End: 2019-02-27
Payer: COMMERCIAL

## 2019-02-27 DIAGNOSIS — Z79.899 ON ISOTRETINOIN THERAPY: ICD-10-CM

## 2019-02-27 DIAGNOSIS — L70.0 ACNE VULGARIS: Primary | ICD-10-CM

## 2019-02-27 DIAGNOSIS — L70.0 ACNE VULGARIS: ICD-10-CM

## 2019-02-27 LAB — HCG UR QL: NEGATIVE

## 2019-02-27 RX ORDER — ISOTRETINOIN 30 MG/1
60 CAPSULE ORAL DAILY
Qty: 60 CAPSULE | Refills: 0 | Status: SHIPPED | OUTPATIENT
Start: 2019-02-27

## 2019-02-27 ASSESSMENT — PAIN SCALES - GENERAL: PAINLEVEL: NO PAIN (0)

## 2019-02-27 NOTE — NURSING NOTE
"Dermatology Rooming Note    Zeeshan Feng's goals for this visit include:   Chief Complaint   Patient presents with     Derm Problem     Accutane, Zeeshan states her skin is \"pretty good.\"     Amee Gonzalez LPN  "

## 2019-02-27 NOTE — LETTER
"2/27/2019       RE: Zeeshan Feng  [address]  [facility] [address]     Dear Colleague,    Thank you for referring your patient, Zeeshan Feng, to the [facility] DERMATOLOGY at [facility]. Please see a copy of my visit note below.    [facility] Dermatology Note      Dermatology Problem List:  1.Acne Vulgaris-  isotretinoin to 60mg po QD, end of month #5  -previous txs: tretinoin 0.025% QHS, clindamycin 1% lotion every day  2.Hyperpigmetnation/melasma- tretinoin 0.025%, hydroquinone 4% BID - holding these right now due to tx with isotretinoin    CC:   Chief Complaint   Patient presents with     Derm Problem     Accutane, Tene states her skin is \"pretty good.\"     Encounter Date: Feb 27, 2019    History of Present Illness:  Ms. Zeeshan Feng is a 38 year old female who returns to the dermatology clinic regarding acne. The patient was last seen in the dermatology clinic on 01/02/19 during which she continued isotretinoin 60 mg daily. Today she is at the end of month #5 of medication. She reports that she has been off of the medication for about 2-3 weeks, as she missed her appointment  for this month. She has not experienced many acne outbreaks after being off the medication. Her lip cracking have been improving with the fluocinonide.     The patient reports tolerable mucocutaneous dryness, and denies arthralgias, myalgias, depression, suicidal ideation, diarrhea, or blurred vision. She denies sharing the medication or donating blood since her last visit.     Past Medical History:   There is no problem list on file for this patient.    No past medical history on file.  No past surgical history on file.    Social History:  The patient works. Not currently sexually active at all.      Family History:  Not obtained today.    Medications:  Current Outpatient Medications   Medication Sig Dispense Refill     fluocinonide (LIDEX) 0.05 % external ointment Apply topically 2 " times daily To affected areas on the lips as needed. 15 g 0     ISOtretinoin (ABSORICA) 30 MG capsule Take 2 capsules (60 mg) by mouth daily 60 capsule 0     No Known Allergies      Review of Systems:  -Neuro: no HA or vision changes  -GI: No nausea, blood in stool, diarrhea, hx of IBD  -Psych: no depression/anxiety, mood changes, or sleep problems   -Musculoskeletal: no joint or muscle pain or swelling   -Heme/Lymph: no concerning bumps, no bleeding problems  -Constitutional: The patient denies fatigue, fevers, chills, unintended weight loss, and night sweats.  -: increased frequency of menstruation  -Skin: As above in HPI. No additional skin concerns.    Physical exam:  Vitals: 130lbs, 59.1kg  GEN: This is a well developed, well-nourished female in no acute distress, in a pleasant mood.    SKIN: Acne exam, which includes the face, neck, upper central chest, and upper central back was performed. Significant for:  -No active acneiform papules or pustules noted to face  - Chest and back are clear  - Mild xerosis noted to lips  - Hyperpigmentation noted to bilateral labial commissures. No active fissuring   -No other lesions of concern on areas examined.     Impression/Plan:  1.Acne vulgaris, on isotretinoin- at the end of month # 5  Edu on avoidance of alcohol, waxing, skin lasers, tattoos, and blood donation. Reminded patient of risks regarding pregnancy. Discussed iPledge and need to  medication within 7 days of this visit.   Continue isotretinoin 60mg daily pending negative pregnancy test. One month supply with no refills provided. Goal dose is 150-220mg/kg for this 59.1kg patient.    After reviewing in Epic, previous labs wnl. No further blood monitoring required.  Qualitative hCG to be obtained.   Contraception: Abstinence   Total cumulative dose 131.9 mg/kg (7,800 mg)   Will plan for one final month of medication   Patient's iPledge # is [id number]  Continue diligent use of emollients     2. Retinoid  dermatitis/Cheilitis with post inflammatory hyperpigmentation.     If symptomatic, okay to use fluocinonide 0.05% ointment, apply to lips BID until symptoms resolve     Follow-up in 3 months with Barbi Haq PA-C, yury for new or changing lesions.     Staff Involved:  Scribe/Staff    Scribe Disclosure:   I, Shirley Cormier, am serving as a scribe to document services personally performed by Aurora Avendano PA-C, based on data collection and the provider's statements to me.    Provider Disclosure:   The documentation recorded by the scribe accurately reflects the services I personally performed and the decisions made by me.    All risks, benefits and alternatives were discussed with patient.  Patient is in agreement and understands the assessment and plan.  All questions were answered.  Sun Screen Education was given.   Return to Clinic in 3 months or sooner as needed.   Aurora Avendano PA-C   [facility]                                     Again, thank you for allowing me to participate in the care of your patient.      Sincerely,    Aurora Avendano PA-C

## 2019-02-27 NOTE — LETTER
Date:February 28, 2019      Patient was self referred, no letter generated. Do not send.        [facility] Health Information

## 2019-02-27 NOTE — PROGRESS NOTES
"[facility] Dermatology Note      Dermatology Problem List:  1.Acne Vulgaris-  isotretinoin to 60mg po QD, end of month #5  -previous txs: tretinoin 0.025% QHS, clindamycin 1% lotion every day  2.Hyperpigmetnation/melasma- tretinoin 0.025%, hydroquinone 4% BID - holding these right now due to tx with isotretinoin    CC:   Chief Complaint   Patient presents with     Derm Problem     Accutane, Tene states her skin is \"pretty good.\"     Encounter Date: Feb 27, 2019    History of Present Illness:  Ms. Zeeshan Feng is a 38 year old female who returns to the dermatology clinic regarding acne. The patient was last seen in the dermatology clinic on 01/02/19 during which she continued isotretinoin 60 mg daily. Today she is at the end of month #5 of medication. She reports that she has been off of the medication for about 2-3 weeks, as she missed her appointment  for this month. She has not experienced many acne outbreaks after being off the medication. Her lip cracking have been improving with the fluocinonide.     The patient reports tolerable mucocutaneous dryness, and denies arthralgias, myalgias, depression, suicidal ideation, diarrhea, or blurred vision. She denies sharing the medication or donating blood since her last visit.     Past Medical History:   There is no problem list on file for this patient.    No past medical history on file.  No past surgical history on file.    Social History:  The patient works. Not currently sexually active at all.      Family History:  Not obtained today.    Medications:  Current Outpatient Medications   Medication Sig Dispense Refill     fluocinonide (LIDEX) 0.05 % external ointment Apply topically 2 times daily To affected areas on the lips as needed. 15 g 0     ISOtretinoin (ABSORICA) 30 MG capsule Take 2 capsules (60 mg) by mouth daily 60 capsule 0     No Known Allergies      Review of Systems:  -Neuro: no HA or vision changes  -GI: No nausea, blood in stool, " diarrhea, hx of IBD  -Psych: no depression/anxiety, mood changes, or sleep problems   -Musculoskeletal: no joint or muscle pain or swelling   -Heme/Lymph: no concerning bumps, no bleeding problems  -Constitutional: The patient denies fatigue, fevers, chills, unintended weight loss, and night sweats.  -: increased frequency of menstruation  -Skin: As above in HPI. No additional skin concerns.    Physical exam:  Vitals: 130lbs, 59.1kg  GEN: This is a well developed, well-nourished female in no acute distress, in a pleasant mood.    SKIN: Acne exam, which includes the face, neck, upper central chest, and upper central back was performed. Significant for:  -No active acneiform papules or pustules noted to face  - Chest and back are clear  - Mild xerosis noted to lips  - Hyperpigmentation noted to bilateral labial commissures. No active fissuring   -No other lesions of concern on areas examined.     Impression/Plan:  1.Acne vulgaris, on isotretinoin- at the end of month # 5  Edu on avoidance of alcohol, waxing, skin lasers, tattoos, and blood donation. Reminded patient of risks regarding pregnancy. Discussed iPledge and need to  medication within 7 days of this visit.   Continue isotretinoin 60mg daily pending negative pregnancy test. One month supply with no refills provided. Goal dose is 150-220mg/kg for this 59.1kg patient.    After reviewing in Epic, previous labs wnl. No further blood monitoring required.  Qualitative hCG to be obtained.   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     If symptomatic, okay to use fluocinonide 0.05% ointment, apply to lips BID until symptoms resolve     Follow-up in 3 months with Barbi Haq PA-C, yury for new or changing lesions.     Staff  Involved:  Scribe/Staff    Scribe Disclosure:   I, Shirley Cormier, am serving as a scribe to document services personally performed by Aurora Avendano PA-C, based on data collection and the provider's statements to me.    Provider Disclosure:   The documentation recorded by the scribe accurately reflects the services I personally performed and the decisions made by me.    All risks, benefits and alternatives were discussed with patient.  Patient is in agreement and understands the assessment and plan.  All questions were answered.  Sun Screen Education was given.   Return to Clinic in 3 months or sooner as needed.   Aurora Avendano PA-C   [facility]

## 2019-05-29 ENCOUNTER — OFFICE VISIT (OUTPATIENT)
Dept: DERMATOLOGY | Facility: CLINIC | Age: 39
End: 2019-05-29
Payer: COMMERCIAL

## 2019-05-29 DIAGNOSIS — L70.0 ACNE VULGARIS: Primary | ICD-10-CM

## 2019-05-29 RX ORDER — TRETINOIN 0.25 MG/G
CREAM TOPICAL
Qty: 45 G | Refills: 3 | Status: SHIPPED | OUTPATIENT
Start: 2019-05-29

## 2019-05-29 ASSESSMENT — PAIN SCALES - GENERAL: PAINLEVEL: NO PAIN (0)

## 2019-05-29 NOTE — NURSING NOTE
"Dermatology Rooming Note    Zeeshan So's goals for this visit include:   Chief Complaint   Patient presents with     Accutane     Zeeshan is here today for a post accutane follow up- Zeeshan states \"it's good\".      Sara Wood, FRANCY     "

## 2019-05-29 NOTE — LETTER
"5/29/2019       RE: Zeeshan Feng  [address]  [facility] [address]     Dear Colleague,    Thank you for referring your patient, Zeeshan Feng, to the [facility] DERMATOLOGY at [facility]. Please see a copy of my visit note below.    [facility] Dermatology Note      Dermatology Problem List:  1.Acne Vulgaris-  tretinoin 0.025% QHS  -previous txs:clindamycin 1% lotion every day, isotretinoin completed 3/27/19 with cumulative dose of 162.4mg/kg  2.Hyperpigmetnation/melasma- tretinoin 0.025%, hydroquinone 4% BID     Encounter Date: May 29, 2019    CC:  Chief Complaint   Patient presents with     Accutane     Zeeshan is here today for a post accutane follow up- Zeeshan states \"it's good\".          History of Present Illness:  Ms. Zeeshan Feng is a 39 year old female who presents as a follow-up for acne. The patient was last seen 2/27/19 when isotretinoin was discontinued and fluocinonide 0.05% ointment was recommended to apply to the lips. At today's visit patient states that she has only had one acne lesion during her menstrual cycle. She has been using Cetaphil soap to wash her face. She has not been using any OTC or prescription medication. The patient denies painful, itching, tingling or bleeding lesions unless otherwise noted. Would like sunscreen recommendations. She is really happy with her skin at this time.       Past Medical History:   There is no problem list on file for this patient.    No past medical history on file.  No past surgical history on file.    Social History:   reports that she has been smoking.  She has never used smokeless tobacco.    Family History:  Family History   Problem Relation Age of Onset     Melanoma No family hx of      Skin Cancer No family hx of        Medications:  Current Outpatient Medications   Medication Sig Dispense Refill     fluocinonide (LIDEX) 0.05 % external ointment Apply topically 2 times daily To affected areas on the lips " as needed. 15 g 0     ISOtretinoin (ABSORICA) 30 MG capsule Take 2 capsules (60 mg) by mouth daily (Patient not taking: Reported on 5/29/2019) 60 capsule 0       No Known Allergies    Review of Systems:  -Constitutional: The patient denies fatigue, fevers, chills, unintended weight loss, and night sweats.  -HEENT: Patient denies nonhealing oral sores.  -Skin: As above in HPI. No additional skin concerns.    Physical exam:  Vitals: There were no vitals taken for this visit.  GEN: This is a well developed, well-nourished female in no acute distress, in a pleasant mood.    SKIN: Acne exam, which includes the face, neck, upper central chest, and upper central back was performed.  -No active acneiform papules or pustules noted to face  -Chest and back are clear  -No other lesions of concern on areas examined.       Impression/Plan:  1. Acne vulgaris    Start tretinoin 0.025% cream, use every night     May restart hydroquinone every day x 12 weeks if concerned about dark spots    Sun precaution was advised including the use of sun screens of SPF 30 or higher, sun protective clothing, and avoidance of tanning beds. Recommended Elta MD or skin cuticles.     Sun screen handout was provided     Recommended CeraVe moisturizer, Skin [employer] Sun Screen and C&E mara Gomez on close of this encounter.  Follow-up prn for new or changing lesions.       Staff Involved:  Staff/Scribe    Scribe Disclosure:  Ela WAGONER, am serving as a scribe to document services personally performed by Lisbet Haq PA-C, based on data collection and the provider's statements to me.     Provider Disclosure:   The documentation recorded by the scribe accurately reflects the services I personally performed and the decisions made by me.    All risks, benefits and alternatives were discussed with patient.  Patient is in agreement and understands the assessment and plan.  All questions were answered.    Lisbet Haq  SILVIA  [facility]: Phone: [phone], Fax: [phone]

## 2019-05-29 NOTE — PROGRESS NOTES
"[facility] Dermatology Note      Dermatology Problem List:  1.Acne Vulgaris-  tretinoin 0.025% QHS  -previous txs:clindamycin 1% lotion every day, isotretinoin completed 3/27/19 with cumulative dose of 162.4mg/kg  2.Hyperpigmetnation/melasma- tretinoin 0.025%, hydroquinone 4% BID     Encounter Date: May 29, 2019    CC:  Chief Complaint   Patient presents with     Accutane     Zeeshan is here today for a post accutane follow up- Zeeshan states \"it's good\".          History of Present Illness:  Ms. Zeeshan Feng is a 39 year old female who presents as a follow-up for acne. The patient was last seen 2/27/19 when isotretinoin was discontinued and fluocinonide 0.05% ointment was recommended to apply to the lips. At today's visit patient states that she has only had one acne lesion during her menstrual cycle. She has been using Cetaphil soap to wash her face. She has not been using any OTC or prescription medication. The patient denies painful, itching, tingling or bleeding lesions unless otherwise noted. Would like sunscreen recommendations. She is really happy with her skin at this time.       Past Medical History:   There is no problem list on file for this patient.    No past medical history on file.  No past surgical history on file.    Social History:   reports that she has been smoking.  She has never used smokeless tobacco.    Family History:  Family History   Problem Relation Age of Onset     Melanoma No family hx of      Skin Cancer No family hx of        Medications:  Current Outpatient Medications   Medication Sig Dispense Refill     fluocinonide (LIDEX) 0.05 % external ointment Apply topically 2 times daily To affected areas on the lips as needed. 15 g 0     ISOtretinoin (ABSORICA) 30 MG capsule Take 2 capsules (60 mg) by mouth daily (Patient not taking: Reported on 5/29/2019) 60 capsule 0       No Known Allergies    Review of Systems:  -Constitutional: The patient denies fatigue, fevers, chills, " unintended weight loss, and night sweats.  -HEENT: Patient denies nonhealing oral sores.  -Skin: As above in HPI. No additional skin concerns.    Physical exam:  Vitals: There were no vitals taken for this visit.  GEN: This is a well developed, well-nourished female in no acute distress, in a pleasant mood.    SKIN: Acne exam, which includes the face, neck, upper central chest, and upper central back was performed.  -No active acneiform papules or pustules noted to face  -Chest and back are clear  -No other lesions of concern on areas examined.       Impression/Plan:  1. Acne vulgaris    Start tretinoin 0.025% cream, use every night     May restart hydroquinone every day x 12 weeks if concerned about dark spots    Sun precaution was advised including the use of sun screens of SPF 30 or higher, sun protective clothing, and avoidance of tanning beds. Recommended Elta MD or skin cuticles.     Sun screen handout was provided     Recommended CeraVe moisturizer, Skin Ceuticals Sun Screen and C&E ferulic     CC Dr. Gomez on close of this encounter.  Follow-up prn for new or changing lesions.       Staff Involved:  Staff/Scribe    Scribe Disclosure:  I, Ela Caro, am serving as a scribe to document services personally performed by Lisbet Haq PA-C, based on data collection and the provider's statements to me.     Provider Disclosure:   The documentation recorded by the scribe accurately reflects the services I personally performed and the decisions made by me.    All risks, benefits and alternatives were discussed with patient.  Patient is in agreement and understands the assessment and plan.  All questions were answered.    Lisbet Haq PA-C  [facility]: Phone: [phone], Fax: [phone]

## 2019-06-25 DIAGNOSIS — L81.0 POST-INFLAMMATORY HYPERPIGMENTATION: Primary | ICD-10-CM

## 2019-06-25 RX ORDER — HYDROQUINONE 40 MG/G
CREAM TOPICAL
Qty: 56.8 G | Refills: 1 | Status: SHIPPED | OUTPATIENT
Start: 2019-06-25

## 2019-10-26 ENCOUNTER — MYC REFILL (OUTPATIENT)
Dept: DERMATOLOGY | Facility: CLINIC | Age: 39
End: 2019-10-26

## 2019-10-26 DIAGNOSIS — K13.0 CHEILITIS: ICD-10-CM

## 2019-10-28 RX ORDER — FLUOCINONIDE 0.5 MG/G
OINTMENT TOPICAL 2 TIMES DAILY
Qty: 15 G | Refills: 0 | Status: SHIPPED | OUTPATIENT
Start: 2019-10-28

## 2020-03-10 ENCOUNTER — HEALTH MAINTENANCE LETTER (OUTPATIENT)
Age: 40
End: 2020-03-10

## 2020-12-27 ENCOUNTER — HEALTH MAINTENANCE LETTER (OUTPATIENT)
Age: 40
End: 2020-12-27

## 2021-04-24 ENCOUNTER — HEALTH MAINTENANCE LETTER (OUTPATIENT)
Age: 41
End: 2021-04-24

## 2021-10-09 ENCOUNTER — HEALTH MAINTENANCE LETTER (OUTPATIENT)
Age: 41
End: 2021-10-09

## 2022-05-16 ENCOUNTER — HEALTH MAINTENANCE LETTER (OUTPATIENT)
Age: 42
End: 2022-05-16

## 2022-09-11 ENCOUNTER — HEALTH MAINTENANCE LETTER (OUTPATIENT)
Age: 42
End: 2022-09-11

## 2023-06-03 ENCOUNTER — HEALTH MAINTENANCE LETTER (OUTPATIENT)
Age: 43
End: 2023-06-03

## 2024-02-24 ENCOUNTER — HEALTH MAINTENANCE LETTER (OUTPATIENT)
Age: 44
End: 2024-02-24